# Patient Record
Sex: MALE | Race: OTHER | URBAN - METROPOLITAN AREA
[De-identification: names, ages, dates, MRNs, and addresses within clinical notes are randomized per-mention and may not be internally consistent; named-entity substitution may affect disease eponyms.]

---

## 2020-12-09 ENCOUNTER — HOSPITAL ENCOUNTER (INPATIENT)
Age: 45
LOS: 4 days | Discharge: HOME | DRG: 177 | End: 2020-12-14
Attending: STUDENT IN AN ORGANIZED HEALTH CARE EDUCATION/TRAINING PROGRAM | Admitting: INTERNAL MEDICINE

## 2020-12-09 DIAGNOSIS — J18.9 PNEUMONIA OF BOTH LUNGS DUE TO INFECTIOUS ORGANISM, UNSPECIFIED PART OF LUNG: Primary | ICD-10-CM

## 2020-12-09 DIAGNOSIS — Z20.822 SUSPECTED COVID-19 VIRUS INFECTION: ICD-10-CM

## 2020-12-09 LAB
ALBUMIN SERPL-MCNC: 2.6 G/DL (ref 3.5–5)
ALBUMIN/GLOB SERPL: 0.6 {RATIO} (ref 1.2–3.5)
ALP SERPL-CCNC: 273 U/L (ref 50–136)
ALT SERPL-CCNC: 150 U/L (ref 12–65)
ANION GAP SERPL CALC-SCNC: 6 MMOL/L (ref 7–16)
AST SERPL-CCNC: 162 U/L (ref 15–37)
BASOPHILS # BLD: 0 K/UL (ref 0–0.2)
BASOPHILS NFR BLD: 0 % (ref 0–2)
BILIRUB SERPL-MCNC: 0.9 MG/DL (ref 0.2–1.1)
BUN SERPL-MCNC: 7 MG/DL (ref 6–23)
CALCIUM SERPL-MCNC: 8.6 MG/DL (ref 8.3–10.4)
CHLORIDE SERPL-SCNC: 109 MMOL/L (ref 98–107)
CO2 SERPL-SCNC: 26 MMOL/L (ref 21–32)
CREAT SERPL-MCNC: 0.7 MG/DL (ref 0.8–1.5)
DIFFERENTIAL METHOD BLD: ABNORMAL
EOSINOPHIL # BLD: 0.2 K/UL (ref 0–0.8)
EOSINOPHIL NFR BLD: 3 % (ref 0.5–7.8)
ERYTHROCYTE [DISTWIDTH] IN BLOOD BY AUTOMATED COUNT: 12.6 % (ref 11.9–14.6)
GLOBULIN SER CALC-MCNC: 4.6 G/DL (ref 2.3–3.5)
GLUCOSE SERPL-MCNC: 107 MG/DL (ref 65–100)
HCT VFR BLD AUTO: 38.2 % (ref 41.1–50.3)
HGB BLD-MCNC: 12.9 G/DL (ref 13.6–17.2)
IMM GRANULOCYTES # BLD AUTO: 0 K/UL (ref 0–0.5)
IMM GRANULOCYTES NFR BLD AUTO: 1 % (ref 0–5)
LACTATE SERPL-SCNC: 0.9 MMOL/L (ref 0.4–2)
LYMPHOCYTES # BLD: 1.6 K/UL (ref 0.5–4.6)
LYMPHOCYTES NFR BLD: 24 % (ref 13–44)
MCH RBC QN AUTO: 28.7 PG (ref 26.1–32.9)
MCHC RBC AUTO-ENTMCNC: 33.8 G/DL (ref 31.4–35)
MCV RBC AUTO: 85.1 FL (ref 79.6–97.8)
MONOCYTES # BLD: 0.7 K/UL (ref 0.1–1.3)
MONOCYTES NFR BLD: 10 % (ref 4–12)
NEUTS SEG # BLD: 4.2 K/UL (ref 1.7–8.2)
NEUTS SEG NFR BLD: 62 % (ref 43–78)
NRBC # BLD: 0 K/UL (ref 0–0.2)
PLATELET # BLD AUTO: 405 K/UL (ref 150–450)
PMV BLD AUTO: 9.5 FL (ref 9.4–12.3)
POTASSIUM SERPL-SCNC: 3.8 MMOL/L (ref 3.5–5.1)
PROT SERPL-MCNC: 7.2 G/DL (ref 6.3–8.2)
RBC # BLD AUTO: 4.49 M/UL (ref 4.23–5.6)
SODIUM SERPL-SCNC: 141 MMOL/L (ref 136–145)
WBC # BLD AUTO: 6.8 K/UL (ref 4.3–11.1)

## 2020-12-09 PROCEDURE — 85025 COMPLETE CBC W/AUTO DIFF WBC: CPT

## 2020-12-09 PROCEDURE — 99285 EMERGENCY DEPT VISIT HI MDM: CPT

## 2020-12-09 PROCEDURE — 83605 ASSAY OF LACTIC ACID: CPT

## 2020-12-09 PROCEDURE — 80053 COMPREHEN METABOLIC PANEL: CPT

## 2020-12-10 ENCOUNTER — APPOINTMENT (OUTPATIENT)
Dept: ULTRASOUND IMAGING | Age: 45
DRG: 177 | End: 2020-12-10
Attending: INTERNAL MEDICINE

## 2020-12-10 ENCOUNTER — APPOINTMENT (OUTPATIENT)
Dept: GENERAL RADIOLOGY | Age: 45
DRG: 177 | End: 2020-12-10
Attending: STUDENT IN AN ORGANIZED HEALTH CARE EDUCATION/TRAINING PROGRAM

## 2020-12-10 ENCOUNTER — APPOINTMENT (OUTPATIENT)
Dept: CT IMAGING | Age: 45
DRG: 177 | End: 2020-12-10
Attending: INTERNAL MEDICINE

## 2020-12-10 PROBLEM — J96.90 RESPIRATORY FAILURE (HCC): Status: ACTIVE | Noted: 2020-12-10

## 2020-12-10 LAB
ALBUMIN SERPL-MCNC: 2.7 G/DL (ref 3.5–5)
ALBUMIN/GLOB SERPL: 0.5 {RATIO} (ref 1.2–3.5)
ALP SERPL-CCNC: 262 U/L (ref 50–136)
ALT SERPL-CCNC: 125 U/L (ref 12–65)
ANION GAP SERPL CALC-SCNC: 7 MMOL/L (ref 7–16)
AST SERPL-CCNC: 79 U/L (ref 15–37)
BASOPHILS # BLD: 0 K/UL (ref 0–0.2)
BASOPHILS NFR BLD: 0 % (ref 0–2)
BILIRUB SERPL-MCNC: 0.7 MG/DL (ref 0.2–1.1)
BUN SERPL-MCNC: 7 MG/DL (ref 6–23)
CALCIUM SERPL-MCNC: 9 MG/DL (ref 8.3–10.4)
CHLORIDE SERPL-SCNC: 108 MMOL/L (ref 98–107)
CO2 SERPL-SCNC: 25 MMOL/L (ref 21–32)
CREAT SERPL-MCNC: 0.64 MG/DL (ref 0.8–1.5)
D DIMER PPP FEU-MCNC: 2.05 UG/ML(FEU)
DIFFERENTIAL METHOD BLD: ABNORMAL
EOSINOPHIL # BLD: 0 K/UL (ref 0–0.8)
EOSINOPHIL NFR BLD: 0 % (ref 0.5–7.8)
ERYTHROCYTE [DISTWIDTH] IN BLOOD BY AUTOMATED COUNT: 12.7 % (ref 11.9–14.6)
FLUAV AG NPH QL IA: NEGATIVE
FLUBV AG NPH QL IA: NEGATIVE
GLOBULIN SER CALC-MCNC: 5 G/DL (ref 2.3–3.5)
GLUCOSE SERPL-MCNC: 164 MG/DL (ref 65–100)
HAV IGM SER QL: NONREACTIVE
HBV CORE IGM SER QL: NONREACTIVE
HBV SURFACE AG SER QL: NONREACTIVE
HCT VFR BLD AUTO: 41.1 % (ref 41.1–50.3)
HCV AB SER QL: NONREACTIVE
HGB BLD-MCNC: 13.9 G/DL (ref 13.6–17.2)
IMM GRANULOCYTES # BLD AUTO: 0 K/UL (ref 0–0.5)
IMM GRANULOCYTES NFR BLD AUTO: 1 % (ref 0–5)
LYMPHOCYTES # BLD: 0.8 K/UL (ref 0.5–4.6)
LYMPHOCYTES NFR BLD: 18 % (ref 13–44)
MCH RBC QN AUTO: 28.4 PG (ref 26.1–32.9)
MCHC RBC AUTO-ENTMCNC: 33.8 G/DL (ref 31.4–35)
MCV RBC AUTO: 83.9 FL (ref 79.6–97.8)
MONOCYTES # BLD: 0.1 K/UL (ref 0.1–1.3)
MONOCYTES NFR BLD: 3 % (ref 4–12)
NEUTS SEG # BLD: 3.8 K/UL (ref 1.7–8.2)
NEUTS SEG NFR BLD: 79 % (ref 43–78)
NRBC # BLD: 0 K/UL (ref 0–0.2)
PLATELET # BLD AUTO: 447 K/UL (ref 150–450)
PMV BLD AUTO: 9.5 FL (ref 9.4–12.3)
POTASSIUM SERPL-SCNC: 3.9 MMOL/L (ref 3.5–5.1)
PROCALCITONIN SERPL-MCNC: 0.18 NG/ML
PROT SERPL-MCNC: 7.7 G/DL (ref 6.3–8.2)
RBC # BLD AUTO: 4.9 M/UL (ref 4.23–5.6)
SARS COV-2, XPGCVT: NEGATIVE
SODIUM SERPL-SCNC: 140 MMOL/L (ref 136–145)
SOURCE, COVRS: NORMAL
SPECIMEN SOURCE: NORMAL
WBC # BLD AUTO: 4.8 K/UL (ref 4.3–11.1)

## 2020-12-10 PROCEDURE — 84145 PROCALCITONIN (PCT): CPT

## 2020-12-10 PROCEDURE — 87635 SARS-COV-2 COVID-19 AMP PRB: CPT

## 2020-12-10 PROCEDURE — 80074 ACUTE HEPATITIS PANEL: CPT

## 2020-12-10 PROCEDURE — 85379 FIBRIN DEGRADATION QUANT: CPT

## 2020-12-10 PROCEDURE — 96374 THER/PROPH/DIAG INJ IV PUSH: CPT

## 2020-12-10 PROCEDURE — 87449 NOS EACH ORGANISM AG IA: CPT

## 2020-12-10 PROCEDURE — 71260 CT THORAX DX C+: CPT

## 2020-12-10 PROCEDURE — 71045 X-RAY EXAM CHEST 1 VIEW: CPT

## 2020-12-10 PROCEDURE — 80053 COMPREHEN METABOLIC PANEL: CPT

## 2020-12-10 PROCEDURE — 87070 CULTURE OTHR SPECIMN AEROBIC: CPT

## 2020-12-10 PROCEDURE — 87040 BLOOD CULTURE FOR BACTERIA: CPT

## 2020-12-10 PROCEDURE — 74011250637 HC RX REV CODE- 250/637: Performed by: FAMILY MEDICINE

## 2020-12-10 PROCEDURE — 87804 INFLUENZA ASSAY W/OPTIC: CPT

## 2020-12-10 PROCEDURE — 74011000258 HC RX REV CODE- 258: Performed by: INTERNAL MEDICINE

## 2020-12-10 PROCEDURE — 85025 COMPLETE CBC W/AUTO DIFF WBC: CPT

## 2020-12-10 PROCEDURE — 96375 TX/PRO/DX INJ NEW DRUG ADDON: CPT

## 2020-12-10 PROCEDURE — 74011000636 HC RX REV CODE- 636: Performed by: INTERNAL MEDICINE

## 2020-12-10 PROCEDURE — 76705 ECHO EXAM OF ABDOMEN: CPT

## 2020-12-10 PROCEDURE — 65270000029 HC RM PRIVATE

## 2020-12-10 PROCEDURE — 74011250636 HC RX REV CODE- 250/636: Performed by: INTERNAL MEDICINE

## 2020-12-10 PROCEDURE — 74011250636 HC RX REV CODE- 250/636: Performed by: STUDENT IN AN ORGANIZED HEALTH CARE EDUCATION/TRAINING PROGRAM

## 2020-12-10 RX ORDER — PROMETHAZINE HYDROCHLORIDE 25 MG/1
12.5 TABLET ORAL
Status: DISCONTINUED | OUTPATIENT
Start: 2020-12-10 | End: 2020-12-15 | Stop reason: HOSPADM

## 2020-12-10 RX ORDER — DEXAMETHASONE SODIUM PHOSPHATE 100 MG/10ML
10 INJECTION INTRAMUSCULAR; INTRAVENOUS
Status: COMPLETED | OUTPATIENT
Start: 2020-12-10 | End: 2020-12-10

## 2020-12-10 RX ORDER — SODIUM CHLORIDE 0.9 % (FLUSH) 0.9 %
10 SYRINGE (ML) INJECTION
Status: COMPLETED | OUTPATIENT
Start: 2020-12-10 | End: 2020-12-10

## 2020-12-10 RX ORDER — SODIUM CHLORIDE 0.9 % (FLUSH) 0.9 %
5-40 SYRINGE (ML) INJECTION EVERY 8 HOURS
Status: DISCONTINUED | OUTPATIENT
Start: 2020-12-10 | End: 2020-12-15 | Stop reason: HOSPADM

## 2020-12-10 RX ORDER — HYDROCODONE BITARTRATE AND HOMATROPINE METHYLBROMIDE 1.5; 5 MG/5ML; MG/5ML
5 SYRUP ORAL
Status: DISCONTINUED | OUTPATIENT
Start: 2020-12-10 | End: 2020-12-15 | Stop reason: HOSPADM

## 2020-12-10 RX ORDER — LEVOFLOXACIN 5 MG/ML
750 INJECTION, SOLUTION INTRAVENOUS EVERY 24 HOURS
Status: DISCONTINUED | OUTPATIENT
Start: 2020-12-10 | End: 2020-12-15 | Stop reason: HOSPADM

## 2020-12-10 RX ORDER — ONDANSETRON 2 MG/ML
4 INJECTION INTRAMUSCULAR; INTRAVENOUS
Status: DISCONTINUED | OUTPATIENT
Start: 2020-12-10 | End: 2020-12-10

## 2020-12-10 RX ORDER — DEXAMETHASONE 4 MG/1
6 TABLET ORAL DAILY
Status: DISCONTINUED | OUTPATIENT
Start: 2020-12-10 | End: 2020-12-15 | Stop reason: HOSPADM

## 2020-12-10 RX ORDER — SODIUM CHLORIDE 0.9 % (FLUSH) 0.9 %
5-40 SYRINGE (ML) INJECTION AS NEEDED
Status: DISCONTINUED | OUTPATIENT
Start: 2020-12-10 | End: 2020-12-15 | Stop reason: HOSPADM

## 2020-12-10 RX ORDER — POLYETHYLENE GLYCOL 3350 17 G/17G
17 POWDER, FOR SOLUTION ORAL DAILY PRN
Status: DISCONTINUED | OUTPATIENT
Start: 2020-12-10 | End: 2020-12-15 | Stop reason: HOSPADM

## 2020-12-10 RX ORDER — LEVOFLOXACIN 5 MG/ML
750 INJECTION, SOLUTION INTRAVENOUS
Status: COMPLETED | OUTPATIENT
Start: 2020-12-10 | End: 2020-12-10

## 2020-12-10 RX ORDER — ACETAMINOPHEN 650 MG/1
650 SUPPOSITORY RECTAL
Status: DISCONTINUED | OUTPATIENT
Start: 2020-12-10 | End: 2020-12-10

## 2020-12-10 RX ORDER — ENOXAPARIN SODIUM 100 MG/ML
40 INJECTION SUBCUTANEOUS DAILY
Status: DISCONTINUED | OUTPATIENT
Start: 2020-12-10 | End: 2020-12-14

## 2020-12-10 RX ORDER — LABETALOL HYDROCHLORIDE 5 MG/ML
10 INJECTION, SOLUTION INTRAVENOUS
Status: DISCONTINUED | OUTPATIENT
Start: 2020-12-10 | End: 2020-12-15 | Stop reason: HOSPADM

## 2020-12-10 RX ORDER — ACETAMINOPHEN 325 MG/1
650 TABLET ORAL
Status: DISCONTINUED | OUTPATIENT
Start: 2020-12-10 | End: 2020-12-10

## 2020-12-10 RX ADMIN — Medication 10 ML: at 02:59

## 2020-12-10 RX ADMIN — LEVOFLOXACIN 750 MG: 5 INJECTION, SOLUTION INTRAVENOUS at 00:47

## 2020-12-10 RX ADMIN — SODIUM CHLORIDE 100 ML: 900 INJECTION, SOLUTION INTRAVENOUS at 09:24

## 2020-12-10 RX ADMIN — Medication 10 ML: at 13:58

## 2020-12-10 RX ADMIN — HYDROCODONE BITARTRATE AND HOMATROPINE METHYLBROMIDE 5 ML: 5; 1.5 SOLUTION ORAL at 21:21

## 2020-12-10 RX ADMIN — Medication 10 ML: at 22:00

## 2020-12-10 RX ADMIN — LEVOFLOXACIN 750 MG: 5 INJECTION, SOLUTION INTRAVENOUS at 21:21

## 2020-12-10 RX ADMIN — IOPAMIDOL 75 ML: 755 INJECTION, SOLUTION INTRAVENOUS at 09:24

## 2020-12-10 RX ADMIN — Medication 10 ML: at 05:16

## 2020-12-10 RX ADMIN — Medication 10 ML: at 09:24

## 2020-12-10 RX ADMIN — SODIUM CHLORIDE 1000 ML: 900 INJECTION, SOLUTION INTRAVENOUS at 00:41

## 2020-12-10 RX ADMIN — DEXAMETHASONE 6 MG: 4 TABLET ORAL at 11:23

## 2020-12-10 RX ADMIN — ENOXAPARIN SODIUM 40 MG: 40 INJECTION SUBCUTANEOUS at 11:23

## 2020-12-10 RX ADMIN — DEXAMETHASONE SODIUM PHOSPHATE 10 MG: 10 INJECTION INTRAMUSCULAR; INTRAVENOUS at 01:02

## 2020-12-10 NOTE — H&P
Hospitalist Note     Admit Date:  2020 11:56 PM   Name:  Colette Denver   Age:  50 y.o.  :  1975   MRN:  [id number]   PCP:  Jese Garcia MD  Treatment Team: Attending Provider: Toi Crews DO; Primary Nurse: Kign Grubbs RN    HPI/Subjective:     75-year-old male presents to the ER from [facility] with complaints of bilateral pneumonia. He was seen at urgent care earlier in the week for similar symptoms, diagnosed with a pneumonia, and treated with doxycycline and Rocephin. There is no improvement in symptoms so he was transferred here. Patient states he lives in [place] but came here 2 months ago for visiting a family. Reportedly his niece at home has Covid pneumonia. Patient started with symptoms 15 days ago with sinus-like symptoms but had progression to respiratory distress for the last few days. Pt currently required 2 L of oxygen without any prior known lung disease. Reportedly a Covid swab at [facility] was negative. 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 There is no medical history other than appendectomy 20 years ago. His family history is positive for diabetes in mother. In the ER chest x-ray with bilateral infiltrates suspicious for Covid pneumonia. He is currently complaining of cough, no chest pain. 10 systems reviewed and negative except as noted in HPI. No past medical history on file. No past surgical history on file. No Known Allergies   Social History     Tobacco Use    Smoking status: Not on file   Substance Use Topics    Alcohol use: Not on file      No family history on file. Family history reviewed and noncontributory. There is no immunization history on file for this patient.   PTA Medications:  None       Objective:     Patient Vitals for the past 24 hrs:   Temp Pulse Resp BP SpO2   12/10/20 0000  88  (!) 174/103 92 %   20 98.8 °F (37.1 °C) 76 18 (!) 170/111 98 % Oxygen Therapy  O2 Sat (%): 92 % (12/10/20 0000)  Pulse via Oximetry: 88 beats per minute (12/10/20 0000)  O2 Device: Nasal cannula (12/09/20 2016)  O2 Flow Rate (L/min): 3 l/min (12/09/20 2016)    Estimated body mass index is 29.29 kg/m² as calculated from the following:    Height as of this encounter: 5' (1.524 m). Weight as of this encounter: 68 kg (150 lb). No intake or output data in the 24 hours ending 12/10/20 0104    *Note that automatically entered I/Os may not be accurate; dependent on patient compliance with collection and accurate  by assistants. Physical Exam:  Physical Exam  Vitals signs and nursing note reviewed. Constitutional:       General: He is not in acute distress. Appearance: He is well-developed. He is not diaphoretic. Comments: Alert and oriented to person place and time. No acute distress, speaks in clear, fluid sentences. HENT:      Head: Normocephalic and atraumatic. Right Ear: External ear normal.      Left Ear: External ear normal.      Nose: Nose normal.   Eyes:      Pupils: Pupils are equal, round, and reactive to light. Neck:      Musculoskeletal: Normal range of motion. Cardiovascular:      Rate and Rhythm: Normal rate and regular rhythm. Heart sounds: Normal heart sounds. No murmur. No friction rub. No gallop. Pulmonary:      Effort: Pulmonary effort is normal. No tachypnea, bradypnea or respiratory distress. Breath sounds: No stridor. Decreased breath sounds present. No wheezing, rhonchi or rales. Comments: Coughing intermittently during exam.  Coarse throughout. No significant tachypnea or distress  Chest:      Chest wall: No tenderness. Abdominal:      General: There is no distension. Palpations: Abdomen is soft. There is no mass. Tenderness: There is no abdominal tenderness. There is no guarding or rebound. Hernia: No hernia is present. Musculoskeletal: Normal range of motion.          General: No tenderness or deformity. Skin:     General: Skin is warm and dry. Neurological:      Mental Status: He is alert and oriented to person, place, and time. Cranial Nerves: No cranial nerve deficit. I reviewed the labs, imaging, EKGs, telemetry, and other studies done this admission. Data Review:   Recent Results (from the past 24 hour(s))   CBC WITH AUTOMATED DIFF    Collection Time: 12/09/20  8:20 PM   Result Value Ref Range    WBC 6.8 4.3 - 11.1 K/uL    RBC 4.49 4.23 - 5.6 M/uL    HGB 12.9 (L) 13.6 - 17.2 g/dL    HCT 38.2 (L) 41.1 - 50.3 %    MCV 85.1 79.6 - 97.8 FL    MCH 28.7 26.1 - 32.9 PG    MCHC 33.8 31.4 - 35.0 g/dL    RDW 12.6 11.9 - 14.6 %    PLATELET 849 782 - 889 K/uL    MPV 9.5 9.4 - 12.3 FL    ABSOLUTE NRBC 0.00 0.0 - 0.2 K/uL    DF AUTOMATED      NEUTROPHILS 62 43 - 78 %    LYMPHOCYTES 24 13 - 44 %    MONOCYTES 10 4.0 - 12.0 %    EOSINOPHILS 3 0.5 - 7.8 %    BASOPHILS 0 0.0 - 2.0 %    IMMATURE GRANULOCYTES 1 0.0 - 5.0 %    ABS. NEUTROPHILS 4.2 1.7 - 8.2 K/UL    ABS. LYMPHOCYTES 1.6 0.5 - 4.6 K/UL    ABS. MONOCYTES 0.7 0.1 - 1.3 K/UL    ABS. EOSINOPHILS 0.2 0.0 - 0.8 K/UL    ABS. BASOPHILS 0.0 0.0 - 0.2 K/UL    ABS. IMM. GRANS. 0.0 0.0 - 0.5 K/UL   METABOLIC PANEL, COMPREHENSIVE    Collection Time: 12/09/20  8:20 PM   Result Value Ref Range    Sodium 141 136 - 145 mmol/L    Potassium 3.8 3.5 - 5.1 mmol/L    Chloride 109 (H) 98 - 107 mmol/L    CO2 26 21 - 32 mmol/L    Anion gap 6 (L) 7 - 16 mmol/L    Glucose 107 (H) 65 - 100 mg/dL    BUN 7 6 - 23 MG/DL    Creatinine 0.70 (L) 0.8 - 1.5 MG/DL    GFR est AA >60 >60 ml/min/1.73m2    GFR est non-AA >60 >60 ml/min/1.73m2    Calcium 8.6 8.3 - 10.4 MG/DL    Bilirubin, total 0.9 0.2 - 1.1 MG/DL    ALT (SGPT) 150 (H) 12 - 65 U/L    AST (SGOT) 162 (H) 15 - 37 U/L    Alk.  phosphatase 273 (H) 50 - 136 U/L    Protein, total 7.2 6.3 - 8.2 g/dL    Albumin 2.6 (L) 3.5 - 5.0 g/dL    Globulin 4.6 (H) 2.3 - 3.5 g/dL    A-G Ratio 0.6 (L) 1.2 - 3.5     LACTIC ACID    Collection Time: 12/09/20  8:20 PM   Result Value Ref Range    Lactic acid 0.9 0.4 - 2.0 MMOL/L       All Micro Results     Procedure Component Value Units Date/Time    CULTURE, BLOOD [[id number]] Collected:  12/10/20 0049    Order Status:  Completed Specimen:  Blood Updated:  12/10/20 0104    CULTURE, BLOOD [[id number]]     Order Status:  Sent Specimen:  Blood           Current Facility-Administered Medications   Medication Dose Route Frequency    levoFLOXacin (LEVAQUIN) 750 mg in D5W IVPB  750 mg IntraVENous NOW    sodium chloride 0.9 % bolus infusion 1,000 mL  1,000 mL IntraVENous ONCE     No current outpatient medications on file. Other Studies:  No results found for this visit on 12/09/20. Xr Chest Port    Result Date: 12/10/2020  EXAM: Chest x-ray. INDICATION: Chest pain. COMPARISON: None. TECHNIQUE: Frontal view chest x-ray. FINDINGS: There are patchy infiltrates throughout both lungs, worse on the right. The cardiac size, mediastinal contour and pulmonary vasculature are within normal limits. No pneumothorax or pleural effusion is seen. The bony structures are unremarkable. IMPRESSION: Bilateral lung infiltrates suspect for acute pneumonia, possibly Covid 19. Assessment and Plan:      68-year-old male no past medical history presents with progression of flulike illness with respiratory symptoms and cough, patient to be admitted for bilateral pneumonia.     Presumed Covid pneumonia vs ARDS  Acute hypoxemic respiratory failure  -Start Decadron, check Covid rapid and PCR, check pro-Adalberto and D-dimer, was given Levaquin, will continue, check sputum culture  -will obtain CT chest after dimer      abnormal LFTs-check right upper quadrant ultrasound, hepatitis panel, Legionella      Discharge planning: [facility]   DVT ppx ordered  Code status:  Full  Estimated LOS:  Greater than 2 midnights  Risk:  high    Signed:  Jazzy Rosenberg MD

## 2020-12-10 NOTE — ED PROVIDER NOTES
57-year-old  male patient presenting to the emergency department from local emergency [facility] facility with reports of bilateral pneumonia. Patient reportedly started feeling poorly approximately 10 to 15 days ago. He has a known contact with a COVID-19 patient and his daughter. He originally presented emergency [facility] on 12/4/2020 for similar symptoms. He was started on doxycycline after a gram of Rocephin was given in that facility. Patient reports no improvement states his symptoms worsen prompting his return to this facility today. This department was contacted by patient's evaluating provider stating that patient appeared worse and now is requiring 2 L nasal cannula which is abnormal for him. Patient denies any known lung disease or significant past medical history. He apparently had a Covid swab performed that was negative yesterday. There was an attempt to directly admit patient however there are no beds available and patient was subsequently transferred to this department for further evaluation and admission. No past medical history on file. No past surgical history on file. No family history on file.     Social History     Socioeconomic History    Marital status: SINGLE     Spouse name: Not on file    Number of children: Not on file    Years of education: Not on file    Highest education level: Not on file   Occupational History    Not on file   Social Needs    Financial resource strain: Not on file    Food insecurity     Worry: Not on file     Inability: Not on file    Transportation needs     Medical: Not on file     Non-medical: Not on file   Tobacco Use    Smoking status: Not on file   Substance and Sexual Activity    Alcohol use: Not on file    Drug use: Not on file    Sexual activity: Not on file   Lifestyle    Physical activity     Days per week: Not on file     Minutes per session: Not on file    Stress: Not on file   Relationships    Social connections Talks on phone: Not on file     Gets together: Not on file     Attends Buddhist service: Not on file     Active member of club or organization: Not on file     Attends meetings of clubs or organizations: Not on file     Relationship status: Not on file    Intimate partner violence     Fear of current or ex partner: Not on file     Emotionally abused: Not on file     Physically abused: Not on file     Forced sexual activity: Not on file   Other Topics Concern    Not on file   Social History Narrative    Not on file         ALLERGIES: Patient has no known allergies. Review of Systems   Constitutional: Positive for fever. Negative for chills and diaphoresis. HENT: Negative for congestion, sneezing and sore throat. Eyes: Negative for visual disturbance. Respiratory: Positive for cough, chest tightness and shortness of breath. Negative for wheezing. Cardiovascular: Negative for chest pain and leg swelling. Gastrointestinal: Negative for abdominal pain, blood in stool, diarrhea, nausea and vomiting. Endocrine: Negative for polyuria. Genitourinary: Negative for difficulty urinating, dysuria, flank pain, hematuria and urgency. Musculoskeletal: Negative for back pain, myalgias, neck pain and neck stiffness. Skin: Negative for color change and rash. Neurological: Negative for dizziness, syncope, speech difficulty, weakness, light-headedness, numbness and headaches. Psychiatric/Behavioral: Negative for behavioral problems. All other systems reviewed and are negative. Vitals:    12/09/20 2016 12/10/20 0000   BP: (!) 170/111 (!) 174/103   Pulse: 76 88   Resp: 18    Temp: 98.8 °F (37.1 °C)    SpO2: 98% 92%   Weight: 68 kg (150 lb)    Height: 5' (1.524 m)             Physical Exam  Vitals signs and nursing note reviewed. Constitutional:       General: He is not in acute distress. Appearance: He is well-developed. He is not diaphoretic.       Comments: Alert and oriented to person place and time. No acute distress, speaks in clear, fluid sentences. HENT:      Head: Normocephalic and atraumatic. Right Ear: External ear normal.      Left Ear: External ear normal.      Nose: Nose normal.   Eyes:      Pupils: Pupils are equal, round, and reactive to light. Neck:      Musculoskeletal: Normal range of motion. Cardiovascular:      Rate and Rhythm: Normal rate and regular rhythm. Heart sounds: Normal heart sounds. No murmur. No friction rub. No gallop. Pulmonary:      Effort: Pulmonary effort is normal. No tachypnea, bradypnea or respiratory distress. Breath sounds: No stridor. Decreased breath sounds present. No wheezing, rhonchi or rales. Comments: Coughing intermittently during exam.  Coarse throughout. No significant tachypnea or distress  Chest:      Chest wall: No tenderness. Abdominal:      General: There is no distension. Palpations: Abdomen is soft. There is no mass. Tenderness: There is no abdominal tenderness. There is no guarding or rebound. Hernia: No hernia is present. Musculoskeletal: Normal range of motion. General: No tenderness or deformity. Skin:     General: Skin is warm and dry. Neurological:      Mental Status: He is alert and oriented to person, place, and time. Cranial Nerves: No cranial nerve deficit.           MDM  Number of Diagnoses or Management Options     Amount and/or Complexity of Data Reviewed  Clinical lab tests: ordered and reviewed  Tests in the radiology section of CPT®: ordered and reviewed  Tests in the medicine section of CPT®: ordered and reviewed  Independent visualization of images, tracings, or specimens: yes           Procedures

## 2020-12-10 NOTE — PROGRESS NOTES
MSN, CM:  Spoke with patient this afternoon about discharge planning. Patient lives with his brother \"Ritesh\" in his home with 3 steps for entrance. Patient is independent with all ADL's and requires no equipment for ambulation. Patient denies any home oxygen, HH, or rehab in the past.  Patient confirms he has no PCP and uses the ED for primary care. Patient denies any discharge needs at this time. Case Management will continue to follow for discharge needs. Care Management Interventions  PCP Verified by CM: Yes(patient has no PCP)  Mode of Transport at Discharge:  Other (see comment)(family to transport)  Transition of Care Consult (CM Consult): Discharge Planning  Physical Therapy Consult: Yes  Current Support Network: Relative's Home, Other(brother lives in home)  Confirm Follow Up Transport: Self  Freedom of Choice List was Provided with Basic Dialogue that Supports the Patient's Individualized Plan of Care/Goals, Treatment Preferences and Shares the Quality Data Associated with the Providers?: Yes  Discharge Location  Discharge Placement: Home

## 2020-12-10 NOTE — ED NOTES
TRANSFER - OUT REPORT:    Verbal report given to Kerri Root RN (name) on Checo Mendoza  being transferred to 8(unit) for routine progression of care       Report consisted of patients Situation, Background, Assessment and   Recommendations(SBAR). Information from the following report(s) SBAR, Kardex, ED Summary, [facility] and Recent Results was reviewed with the receiving nurse. Lines:   Peripheral IV 12/09/20 Right Antecubital (Active)   Site Assessment Clean, dry, & intact 12/09/20 2019   Phlebitis Assessment 0 12/09/20 2019   Infiltration Assessment 0 12/09/20 2019   Dressing Status Clean, dry, & intact 12/09/20 2019   Dressing Type 4 X 4 12/09/20 2019   Hub Color/Line Status Pink 12/09/20 2019       Peripheral IV 12/10/20 Left Antecubital (Active)   Site Assessment Clean, dry, & intact 12/10/20 0053   Phlebitis Assessment 0 12/10/20 0053   Infiltration Assessment 0 12/10/20 0053   Dressing Status Clean, dry, & intact 12/10/20 0053       Peripheral IV 12/10/20 Right Hand (Active)   Site Assessment Clean, dry, & intact 12/10/20 0125   Phlebitis Assessment 0 12/10/20 0125   Infiltration Assessment 0 12/10/20 0125   Dressing Status Clean, dry, & intact 12/10/20 0125        Opportunity for questions and clarification was provided.       Patient transported with:   Dymant

## 2020-12-10 NOTE — PROGRESS NOTES
12/10/20 0255   Dual Skin Pressure Injury Assessment   Dual Skin Pressure Injury Assessment WDL   Second Care Provider (Based on [address] [place] [address]) MICHELLE Gallardo   Skin Integumentary   Skin Integumentary (WDL) WDL    Pressure  Injury Documentation No Pressure Injury Noted-Pressure Ulcer Prevention Initiated

## 2020-12-10 NOTE — ED TRIAGE NOTES
Patient arrives in wheelchair to triage with mask in place. Patient picked up by EMS from [facility].  Patient went to facility on 12/4 and diagnosed with pneumonia, given shot of rocephin and started on doxycyline. Patient tested negative for covid. Lives with niece, and she tested positive for covid on 12/5. Went back to urgent care today for worsening cough, shortness of breath, fever. Given 1g tylenol for fever of 100.5. Had repeat chest xray, did not send copy of exam, but has paper handouts with results. Patient was given 1L NS at facility. Placed on 3L NC due to O2 of 87% on room air. 20G R AC placed.

## 2020-12-10 NOTE — PROGRESS NOTES
[facility] staff  available over the phone from 3:30 p.m. - midnight. Please call Roshan at [phone] with any interpreting requests.         [facility]  Language Services Department  [address]  [address] [phone] (phone)

## 2020-12-10 NOTE — PROGRESS NOTES
Admitted after midnight with suspected covid PNA. CTA negative for clot, but suggestive of COVID and has known contact. PCR pending. Not wearing his oxygen at the moment, feels like he is working hard to breathe. Only feels very short of breath after coughing.

## 2020-12-10 NOTE — PROGRESS NOTES
rounded on patient with nurse. Interpreting services offered when needed. [facility] staff  available over the phone from 3:30 p.m. - midnight. Please call Roshan at [phone] with any interpreting requests.       [facility]  Language Services Department  [address]  [address]  [phone] (phone)

## 2020-12-10 NOTE — PROGRESS NOTES
PT arrived to unit from tele overflow in stable condition, a/o, with call bell in reach, vitals stable. Pt asked to use call bell for toileting assistance. Heparin drip noted and infusing in iv.

## 2020-12-10 NOTE — PROGRESS NOTES
TRANSFER - IN REPORT:    Verbal report received from Stefani Morocho RN(name) on [facility]  being received from ER(unit) for routine progression of care      Report consisted of patients Situation, Background, Assessment and   Recommendations(SBAR). Information from the following report(s) SBAR, Kardex and ED Summary was reviewed with the receiving nurse. Opportunity for questions and clarification was provided. Assessment completed upon patients arrival to unit and care assumed.

## 2020-12-11 LAB
L PNEUMO1 AG UR QL IA: NEGATIVE
SPECIMEN SOURCE: NORMAL

## 2020-12-11 PROCEDURE — 74011250636 HC RX REV CODE- 250/636: Performed by: INTERNAL MEDICINE

## 2020-12-11 PROCEDURE — 74011250637 HC RX REV CODE- 250/637: Performed by: FAMILY MEDICINE

## 2020-12-11 PROCEDURE — 65270000029 HC RM PRIVATE

## 2020-12-11 RX ORDER — ALBUTEROL SULFATE 0.83 MG/ML
2.5 SOLUTION RESPIRATORY (INHALATION)
Status: ACTIVE | OUTPATIENT
Start: 2020-12-11 | End: 2020-12-12

## 2020-12-11 RX ORDER — ALBUTEROL SULFATE 0.83 MG/ML
2.5 SOLUTION RESPIRATORY (INHALATION)
Status: DISCONTINUED | OUTPATIENT
Start: 2020-12-11 | End: 2020-12-13

## 2020-12-11 RX ADMIN — ENOXAPARIN SODIUM 40 MG: 40 INJECTION SUBCUTANEOUS at 08:03

## 2020-12-11 RX ADMIN — Medication 5 ML: at 14:00

## 2020-12-11 RX ADMIN — Medication 10 ML: at 17:05

## 2020-12-11 RX ADMIN — DEXAMETHASONE 6 MG: 4 TABLET ORAL at 08:04

## 2020-12-11 RX ADMIN — LEVOFLOXACIN 750 MG: 5 INJECTION, SOLUTION INTRAVENOUS at 22:24

## 2020-12-11 RX ADMIN — Medication 10 ML: at 22:28

## 2020-12-11 RX ADMIN — HYDROCODONE BITARTRATE AND HOMATROPINE METHYLBROMIDE 5 ML: 5; 1.5 SOLUTION ORAL at 22:24

## 2020-12-11 RX ADMIN — Medication 10 ML: at 05:56

## 2020-12-11 RX ADMIN — HYDROCODONE BITARTRATE AND HOMATROPINE METHYLBROMIDE 5 ML: 5; 1.5 SOLUTION ORAL at 12:15

## 2020-12-11 NOTE — PROGRESS NOTES
Interpreting services available Please call [phone] for assistance      [place] [address]  [phone] (phone)

## 2020-12-12 PROCEDURE — 87635 SARS-COV-2 COVID-19 AMP PRB: CPT

## 2020-12-12 PROCEDURE — 94640 AIRWAY INHALATION TREATMENT: CPT

## 2020-12-12 PROCEDURE — 74011000250 HC RX REV CODE- 250: Performed by: FAMILY MEDICINE

## 2020-12-12 PROCEDURE — 74011250637 HC RX REV CODE- 250/637: Performed by: FAMILY MEDICINE

## 2020-12-12 PROCEDURE — 65270000029 HC RM PRIVATE

## 2020-12-12 PROCEDURE — 2709999900 HC NON-CHARGEABLE SUPPLY

## 2020-12-12 PROCEDURE — 74011250636 HC RX REV CODE- 250/636: Performed by: INTERNAL MEDICINE

## 2020-12-12 PROCEDURE — 94760 N-INVAS EAR/PLS OXIMETRY 1: CPT

## 2020-12-12 RX ADMIN — ALBUTEROL SULFATE 2.5 MG: 2.5 SOLUTION RESPIRATORY (INHALATION) at 22:15

## 2020-12-12 RX ADMIN — HYDROCODONE BITARTRATE AND HOMATROPINE METHYLBROMIDE 5 ML: 5; 1.5 SOLUTION ORAL at 16:13

## 2020-12-12 RX ADMIN — DEXAMETHASONE 6 MG: 4 TABLET ORAL at 09:07

## 2020-12-12 RX ADMIN — LEVOFLOXACIN 750 MG: 5 INJECTION, SOLUTION INTRAVENOUS at 21:54

## 2020-12-12 RX ADMIN — ENOXAPARIN SODIUM 40 MG: 40 INJECTION SUBCUTANEOUS at 09:07

## 2020-12-12 RX ADMIN — HYDROCODONE BITARTRATE AND HOMATROPINE METHYLBROMIDE 5 ML: 5; 1.5 SOLUTION ORAL at 21:54

## 2020-12-12 RX ADMIN — HYDROCODONE BITARTRATE AND HOMATROPINE METHYLBROMIDE 5 ML: 5; 1.5 SOLUTION ORAL at 09:14

## 2020-12-12 RX ADMIN — Medication 5 ML: at 16:15

## 2020-12-12 RX ADMIN — ALBUTEROL SULFATE 2.5 MG: 2.5 SOLUTION RESPIRATORY (INHALATION) at 01:34

## 2020-12-12 RX ADMIN — Medication 10 ML: at 21:55

## 2020-12-12 RX ADMIN — ALBUTEROL SULFATE 2.5 MG: 2.5 SOLUTION RESPIRATORY (INHALATION) at 13:48

## 2020-12-12 RX ADMIN — ALBUTEROL SULFATE 2.5 MG: 2.5 SOLUTION RESPIRATORY (INHALATION) at 07:28

## 2020-12-12 RX ADMIN — Medication 10 ML: at 06:18

## 2020-12-12 NOTE — PROGRESS NOTES
Patient voices no concerns at this time. Patient is resting in bed with eyes closed and respirations even. Patient denies any pain and appears comfortable at this time. Call light within reach and patient instructed to call if assistance is needed. Will continue to monitor.

## 2020-12-12 NOTE — PROGRESS NOTES
Patient stable with no complaints at this time. Patient is resting in bed with eyes closed. Patient has been given hycodan x2 today for cough. Medication does seem to help. Call light within reach and patient instructed to call if assistance is needed.   Report to be given to oncoming RN 7p-7a

## 2020-12-12 NOTE — PROGRESS NOTES
Pt having heavy coughing and requested something to help relieve cough. Gave pt Hycodan 5 mL per MD orders.

## 2020-12-12 NOTE — PROGRESS NOTES
Problem: Falls - Risk of  Goal: *Absence of Falls  Description: Document Mandy Miller Fall Risk and appropriate interventions in the flowsheet.   Outcome: Progressing Towards Goal  Note: Fall Risk Interventions:  Mobility Interventions: Patient to call before getting OOB              Elimination Interventions: Call light in reach, Patient to call for help with toileting needs              Problem: Patient Education: Go to Patient Education Activity  Goal: Patient/Family Education  Outcome: Progressing Towards Goal

## 2020-12-12 NOTE — PROGRESS NOTES
Hospitalist Progress Note    2020  Admit Date: 2020 11:56 PM   NAME: Suzanne Tan   :  1975   MRN:  [id number]   Attending: Gabriel Hess MD  PCP:  Thomas Rice MD    SUBJECTIVE:   O2 low at times, especially with coughing spells, ambulating to restroom. Subjective fevers  Hycodan helps some    Review of Systems negative with exception of pertinent positives noted above  PHYSICAL EXAM     Visit Vitals  BP (!) 138/91 (BP 1 Location: Left arm, BP Patient Position: At rest)   Pulse 80   Temp 98.4 °F (36.9 °C)   Resp 18   Ht 5' (1.524 m)   Wt 68 kg (150 lb)   SpO2 91%   BMI 29.29 kg/m²      Temp (24hrs), Av.2 °F (36.8 °C), Min:97.9 °F (36.6 °C), Max:98.4 °F (36.9 °C)    Oxygen Therapy  O2 Sat (%): 91 % (20 0800)  Pulse via Oximetry: 70 beats per minute (20)  O2 Device: Room air (20)  O2 Flow Rate (L/min): 3 l/min (12/10/20 1906)    Intake/Output Summary (Last 24 hours) at 2020 1312  Last data filed at 2020 0310  Gross per 24 hour   Intake    Output 475 ml   Net -475 ml      General: No acute distress    Lungs:  Coarse, rales bases   Heart:  Regular rate and rhythm,  No murmur, rub, or gallop  Abdomen: Soft, Non distended, Non tender, Positive bowel sounds  Extremities: No cyanosis, clubbing or edema  Neurologic:  No focal deficits    US ABD LTD   Final Result   IMPRESSION:    Hepatic steatosis. CT CHEST W CONT   Final Result   IMPRESSION:   1. No evidence of pulmonary embolism. 2. Multifocal atypical pneumonia with a pattern commonly seen with Covid 19. XR CHEST PORT   Final Result   IMPRESSION: Bilateral lung infiltrates suspect for acute pneumonia, possibly   Covid 19. ASSESSMENT      Plan:    Tested negative for COVID, but with clinical presentation and radiographic findings, could represent a false negative.  Retest.  Continue levaquin for pneumonia, trend pct  Treat cough symptomatically  Supplemental o2 as needed  Check pBNP      Signed By: William Davila MD     December 12, 2020

## 2020-12-13 LAB
ALBUMIN SERPL-MCNC: 2.6 G/DL (ref 3.5–5)
ALBUMIN/GLOB SERPL: 0.6 {RATIO} (ref 1.2–3.5)
ALP SERPL-CCNC: 178 U/L (ref 50–136)
ALT SERPL-CCNC: 99 U/L (ref 12–65)
ANION GAP SERPL CALC-SCNC: 8 MMOL/L (ref 7–16)
AST SERPL-CCNC: 38 U/L (ref 15–37)
BACTERIA SPEC CULT: NORMAL
BASOPHILS # BLD: 0 K/UL (ref 0–0.2)
BASOPHILS NFR BLD: 0 % (ref 0–2)
BILIRUB SERPL-MCNC: 0.5 MG/DL (ref 0.2–1.1)
BNP SERPL-MCNC: 31 PG/ML (ref 5–125)
BUN SERPL-MCNC: 13 MG/DL (ref 6–23)
CALCIUM SERPL-MCNC: 8.7 MG/DL (ref 8.3–10.4)
CHLORIDE SERPL-SCNC: 103 MMOL/L (ref 98–107)
CO2 SERPL-SCNC: 27 MMOL/L (ref 21–32)
CREAT SERPL-MCNC: 0.67 MG/DL (ref 0.8–1.5)
DIFFERENTIAL METHOD BLD: ABNORMAL
EOSINOPHIL # BLD: 0.1 K/UL (ref 0–0.8)
EOSINOPHIL NFR BLD: 1 % (ref 0.5–7.8)
ERYTHROCYTE [DISTWIDTH] IN BLOOD BY AUTOMATED COUNT: 12.6 % (ref 11.9–14.6)
GLOBULIN SER CALC-MCNC: 4.3 G/DL (ref 2.3–3.5)
GLUCOSE SERPL-MCNC: 126 MG/DL (ref 65–100)
GRAM STN SPEC: NORMAL
HCT VFR BLD AUTO: 38.8 % (ref 41.1–50.3)
HGB BLD-MCNC: 13.3 G/DL (ref 13.6–17.2)
IMM GRANULOCYTES # BLD AUTO: 0.1 K/UL (ref 0–0.5)
IMM GRANULOCYTES NFR BLD AUTO: 1 % (ref 0–5)
LYMPHOCYTES # BLD: 1.6 K/UL (ref 0.5–4.6)
LYMPHOCYTES NFR BLD: 19 % (ref 13–44)
MCH RBC QN AUTO: 28.4 PG (ref 26.1–32.9)
MCHC RBC AUTO-ENTMCNC: 34.3 G/DL (ref 31.4–35)
MCV RBC AUTO: 82.7 FL (ref 79.6–97.8)
MONOCYTES # BLD: 0.7 K/UL (ref 0.1–1.3)
MONOCYTES NFR BLD: 9 % (ref 4–12)
NEUTS SEG # BLD: 5.9 K/UL (ref 1.7–8.2)
NEUTS SEG NFR BLD: 70 % (ref 43–78)
NRBC # BLD: 0 K/UL (ref 0–0.2)
PLATELET # BLD AUTO: 489 K/UL (ref 150–450)
PMV BLD AUTO: 9.4 FL (ref 9.4–12.3)
POTASSIUM SERPL-SCNC: 3.8 MMOL/L (ref 3.5–5.1)
PROCALCITONIN SERPL-MCNC: <0.05 NG/ML
PROT SERPL-MCNC: 6.9 G/DL (ref 6.3–8.2)
RBC # BLD AUTO: 4.69 M/UL (ref 4.23–5.6)
SARS COV-2, XPGCVT: POSITIVE
SERVICE CMNT-IMP: NORMAL
SODIUM SERPL-SCNC: 138 MMOL/L (ref 136–145)
WBC # BLD AUTO: 8.5 K/UL (ref 4.3–11.1)

## 2020-12-13 PROCEDURE — 83880 ASSAY OF NATRIURETIC PEPTIDE: CPT

## 2020-12-13 PROCEDURE — 74011000250 HC RX REV CODE- 250: Performed by: FAMILY MEDICINE

## 2020-12-13 PROCEDURE — 84145 PROCALCITONIN (PCT): CPT

## 2020-12-13 PROCEDURE — 85025 COMPLETE CBC W/AUTO DIFF WBC: CPT

## 2020-12-13 PROCEDURE — 94760 N-INVAS EAR/PLS OXIMETRY 1: CPT

## 2020-12-13 PROCEDURE — 65270000029 HC RM PRIVATE

## 2020-12-13 PROCEDURE — 94640 AIRWAY INHALATION TREATMENT: CPT

## 2020-12-13 PROCEDURE — 74011250637 HC RX REV CODE- 250/637: Performed by: FAMILY MEDICINE

## 2020-12-13 PROCEDURE — 74011250636 HC RX REV CODE- 250/636: Performed by: INTERNAL MEDICINE

## 2020-12-13 PROCEDURE — 80053 COMPREHEN METABOLIC PANEL: CPT

## 2020-12-13 RX ORDER — ALBUTEROL SULFATE 90 UG/1
2 AEROSOL, METERED RESPIRATORY (INHALATION)
Status: DISCONTINUED | OUTPATIENT
Start: 2020-12-13 | End: 2020-12-14

## 2020-12-13 RX ADMIN — HYDROCODONE BITARTRATE AND HOMATROPINE METHYLBROMIDE 5 ML: 5; 1.5 SOLUTION ORAL at 11:28

## 2020-12-13 RX ADMIN — ALBUTEROL SULFATE 2.5 MG: 2.5 SOLUTION RESPIRATORY (INHALATION) at 07:23

## 2020-12-13 RX ADMIN — Medication 10 ML: at 13:49

## 2020-12-13 RX ADMIN — Medication 10 ML: at 22:15

## 2020-12-13 RX ADMIN — ALBUTEROL SULFATE 2.5 MG: 2.5 SOLUTION RESPIRATORY (INHALATION) at 03:44

## 2020-12-13 RX ADMIN — HYDROCODONE BITARTRATE AND HOMATROPINE METHYLBROMIDE 5 ML: 5; 1.5 SOLUTION ORAL at 22:55

## 2020-12-13 RX ADMIN — ENOXAPARIN SODIUM 40 MG: 40 INJECTION SUBCUTANEOUS at 08:51

## 2020-12-13 RX ADMIN — LEVOFLOXACIN 750 MG: 5 INJECTION, SOLUTION INTRAVENOUS at 22:25

## 2020-12-13 RX ADMIN — HYDROCODONE BITARTRATE AND HOMATROPINE METHYLBROMIDE 5 ML: 5; 1.5 SOLUTION ORAL at 05:29

## 2020-12-13 RX ADMIN — ALBUTEROL SULFATE 2.5 MG: 2.5 SOLUTION RESPIRATORY (INHALATION) at 13:00

## 2020-12-13 RX ADMIN — DEXAMETHASONE 6 MG: 4 TABLET ORAL at 08:51

## 2020-12-13 NOTE — PROGRESS NOTES
Pt oxygen was 89-90% on room air.  Put NC on at 3L, will reassess in 30 minutes to see if I can wean down

## 2020-12-13 NOTE — PROGRESS NOTES
Hospitalist Progress Note    2020  Admit Date: 2020 11:56 PM   NAME: Arabella Zarate   :  1975   MRN:  [id number]   Attending: Trini Gibbs MD  PCP:  Thomas Rice MD    SUBJECTIVE:   51yo M without significant medical hx who presented from urgent care with bilateral pneumonia that failed outpatient treatement. He is visiting from [place] and has a positive covid contact here but his initial test is negative. CT chest with bilateral infiltrates concerning for covid. procal low and trended negative on levaquin. bnp negative. No tobacco or drug use. Denies chemical/toxin exposure. he is a professional yeh. Mild transaminitis improving      O2 remains low, now on 2-3 liters, cough slightly better. No fevers. Initial covid test negative, repeat pending  procalcitonin low, trended to zero      Review of Systems negative with exception of pertinent positives noted above  PHYSICAL EXAM     Visit Vitals  /86   Pulse 97   Temp 99.3 °F (37.4 °C)   Resp 18   Ht 5' (1.524 m)   Wt 68 kg (150 lb)   SpO2 93%   BMI 29.29 kg/m²      Temp (24hrs), Av.5 °F (36.9 °C), Min:97.9 °F (36.6 °C), Max:99.3 °F (37.4 °C)    Oxygen Therapy  O2 Sat (%): 93 % (20 0730)  Pulse via Oximetry: 70 beats per minute (20 0723)  O2 Device: Room air (20 0900)  O2 Flow Rate (L/min): 3 l/min (12/10/20 1906)    Intake/Output Summary (Last 24 hours) at 2020 1307  Last data filed at 2020 2356  Gross per 24 hour   Intake    Output 350 ml   Net -350 ml      General: No acute distress    Lungs:  Coarse, rales bases   Heart:  Regular rate and rhythm,  No murmur, rub, or gallop  Abdomen: Soft, Non distended, Non tender, Positive bowel sounds  Extremities: No cyanosis, clubbing or edema  Neurologic:  No focal deficits    US ABD LTD   Final Result   IMPRESSION:    Hepatic steatosis. CT CHEST W CONT   Final Result   IMPRESSION:   1. No evidence of pulmonary embolism.    2. Multifocal atypical pneumonia with a pattern commonly seen with Covid 19. XR CHEST PORT   Final Result   IMPRESSION: Bilateral lung infiltrates suspect for acute pneumonia, possibly   Covid 19. ASSESSMENT      Plan:    Tested negative for COVID, but with clinical presentation and radiographic findings, could represent a false negative.  Retest.  Continue levaquin for pneumonia, trend pct  Treat cough symptomatically  Supplemental o2 as needed  If repeat covid testing is negative, will consult pulmonology       Signed By: William Davila MD     December 13, 2020

## 2020-12-13 NOTE — PROGRESS NOTES
Problem: Falls - Risk of  Goal: *Absence of Falls  Description: Document Rosina Cruz Fall Risk and appropriate interventions in the flowsheet. Outcome: Progressing Towards Goal  Note: Fall Risk Interventions:  Mobility Interventions: Patient to call before getting OOB         Medication Interventions: Evaluate medications/consider consulting pharmacy    Elimination Interventions: Call light in reach, Patient to call for help with toileting needs              Problem: Patient Education: Go to Patient Education Activity  Goal: Patient/Family Education  Outcome: Progressing Towards Goal     Problem: Risk for Spread of Infection  Goal: Prevent transmission of infectious organism to others  Description: Prevent the transmission of infectious organisms to other patients, staff members, and visitors.   Outcome: Progressing Towards Goal     Problem: Patient Education:  Go to Education Activity  Goal: Patient/Family Education  Outcome: Progressing Towards Goal

## 2020-12-14 VITALS
SYSTOLIC BLOOD PRESSURE: 139 MMHG | HEIGHT: 60 IN | WEIGHT: 150 LBS | BODY MASS INDEX: 29.45 KG/M2 | DIASTOLIC BLOOD PRESSURE: 98 MMHG | RESPIRATION RATE: 17 BRPM | OXYGEN SATURATION: 92 % | HEART RATE: 87 BPM | TEMPERATURE: 98.2 F

## 2020-12-14 LAB
ALBUMIN SERPL-MCNC: 2.9 G/DL (ref 3.5–5)
ALBUMIN/GLOB SERPL: 0.6 {RATIO} (ref 1.2–3.5)
ALP SERPL-CCNC: 195 U/L (ref 50–136)
ALT SERPL-CCNC: 122 U/L (ref 12–65)
ANION GAP SERPL CALC-SCNC: 7 MMOL/L (ref 7–16)
AST SERPL-CCNC: 46 U/L (ref 15–37)
BILIRUB SERPL-MCNC: 0.6 MG/DL (ref 0.2–1.1)
BUN SERPL-MCNC: 13 MG/DL (ref 6–23)
CALCIUM SERPL-MCNC: 9.4 MG/DL (ref 8.3–10.4)
CHLORIDE SERPL-SCNC: 103 MMOL/L (ref 98–107)
CO2 SERPL-SCNC: 26 MMOL/L (ref 21–32)
CREAT SERPL-MCNC: 0.81 MG/DL (ref 0.8–1.5)
ERYTHROCYTE [DISTWIDTH] IN BLOOD BY AUTOMATED COUNT: 12.8 % (ref 11.9–14.6)
GLOBULIN SER CALC-MCNC: 4.7 G/DL (ref 2.3–3.5)
GLUCOSE SERPL-MCNC: 161 MG/DL (ref 65–100)
HCT VFR BLD AUTO: 43.5 % (ref 41.1–50.3)
HGB BLD-MCNC: 14.5 G/DL (ref 13.6–17.2)
MCH RBC QN AUTO: 28.4 PG (ref 26.1–32.9)
MCHC RBC AUTO-ENTMCNC: 33.3 G/DL (ref 31.4–35)
MCV RBC AUTO: 85.3 FL (ref 79.6–97.8)
NRBC # BLD: 0 K/UL (ref 0–0.2)
PLATELET # BLD AUTO: 554 K/UL (ref 150–450)
PMV BLD AUTO: 9.7 FL (ref 9.4–12.3)
POTASSIUM SERPL-SCNC: 3.5 MMOL/L (ref 3.5–5.1)
PROT SERPL-MCNC: 7.6 G/DL (ref 6.3–8.2)
RBC # BLD AUTO: 5.1 M/UL (ref 4.23–5.6)
SODIUM SERPL-SCNC: 136 MMOL/L (ref 138–145)
WBC # BLD AUTO: 10.8 K/UL (ref 4.3–11.1)

## 2020-12-14 PROCEDURE — 2709999900 HC NON-CHARGEABLE SUPPLY

## 2020-12-14 PROCEDURE — 85027 COMPLETE CBC AUTOMATED: CPT

## 2020-12-14 PROCEDURE — 36415 COLL VENOUS BLD VENIPUNCTURE: CPT

## 2020-12-14 PROCEDURE — 77010033678 HC OXYGEN DAILY

## 2020-12-14 PROCEDURE — 80053 COMPREHEN METABOLIC PANEL: CPT

## 2020-12-14 PROCEDURE — 94640 AIRWAY INHALATION TREATMENT: CPT

## 2020-12-14 PROCEDURE — 94760 N-INVAS EAR/PLS OXIMETRY 1: CPT

## 2020-12-14 PROCEDURE — 74011250637 HC RX REV CODE- 250/637: Performed by: FAMILY MEDICINE

## 2020-12-14 PROCEDURE — 74011250636 HC RX REV CODE- 250/636: Performed by: INTERNAL MEDICINE

## 2020-12-14 RX ORDER — ALBUTEROL SULFATE 90 UG/1
2 AEROSOL, METERED RESPIRATORY (INHALATION)
Status: DISCONTINUED | OUTPATIENT
Start: 2020-12-14 | End: 2020-12-15 | Stop reason: HOSPADM

## 2020-12-14 RX ORDER — ALBUTEROL SULFATE 90 UG/1
2 AEROSOL, METERED RESPIRATORY (INHALATION)
Qty: 1 INHALER | Refills: 0 | Status: SHIPPED | OUTPATIENT
Start: 2020-12-14

## 2020-12-14 RX ORDER — HYDROCODONE BITARTRATE AND HOMATROPINE METHYLBROMIDE 1.5; 5 MG/5ML; MG/5ML
5 SYRUP ORAL
Qty: 100 ML | Refills: 0 | Status: SHIPPED | OUTPATIENT
Start: 2020-12-14 | End: 2020-12-17

## 2020-12-14 RX ORDER — DEXAMETHASONE 6 MG/1
6 TABLET ORAL
Qty: 7 TAB | Refills: 0 | Status: SHIPPED | OUTPATIENT
Start: 2020-12-14

## 2020-12-14 RX ORDER — ENOXAPARIN SODIUM 100 MG/ML
30 INJECTION SUBCUTANEOUS EVERY 12 HOURS
Status: DISCONTINUED | OUTPATIENT
Start: 2020-12-14 | End: 2020-12-15 | Stop reason: HOSPADM

## 2020-12-14 RX ADMIN — ENOXAPARIN SODIUM 40 MG: 40 INJECTION SUBCUTANEOUS at 09:38

## 2020-12-14 RX ADMIN — HYDROCODONE BITARTRATE AND HOMATROPINE METHYLBROMIDE 5 ML: 5; 1.5 SOLUTION ORAL at 11:03

## 2020-12-14 RX ADMIN — Medication 5 ML: at 17:47

## 2020-12-14 RX ADMIN — Medication 10 ML: at 06:19

## 2020-12-14 RX ADMIN — HYDROCODONE BITARTRATE AND HOMATROPINE METHYLBROMIDE 5 ML: 5; 1.5 SOLUTION ORAL at 17:45

## 2020-12-14 RX ADMIN — ALBUTEROL SULFATE 2 PUFF: 108 AEROSOL, METERED RESPIRATORY (INHALATION) at 02:46

## 2020-12-14 RX ADMIN — ALBUTEROL SULFATE 2 PUFF: 108 AEROSOL, METERED RESPIRATORY (INHALATION) at 07:29

## 2020-12-14 RX ADMIN — DEXAMETHASONE 6 MG: 4 TABLET ORAL at 09:38

## 2020-12-14 NOTE — PROGRESS NOTES
Oxygen Qualifier       Room air: SpO2 with O2 and liter flow   Resting SpO2  92%     Ambulating SpO2 91%        Completed by:    Honey Rodriguez, RT

## 2020-12-14 NOTE — DISCHARGE SUMMARY
Hospitalist Discharge Summary     Patient ID:  Eula Mahajan  [id number]  75 y.o.  1975  Admit date: 12/9/2020 11:56 PM  Discharge date and time: 12/14/2020  Attending: Judith Rosa MD  PCP:  Daryl Lopez MD  Treatment Team: Attending Provider: Judith Rosa MD; Utilization Review: Charles Mai RN; Primary Nurse: Brennon Gray    Principal Diagnosis <principal problem not specified>   Active Problems:    Respiratory failure [facility]) (12/10/2020)             Hospital Course:  Please refer to the admission H&P for details of presentation. In summary, the patient is a healthy 53yo M with no significant medical history who presented with cough, weakness in the setting of known COVID contacts. Found to have bilateral pneumonia but initial COVID test negative. A confirmatory test was positive for COVID. He was at worst mildly hypoxic and mostly symptomatic from cough. He has been treated with hycodan and dexamethasone with symptomatic improvement. Hypoxia resolved at time of discharge. Significant Diagnostic Studies:   US ABD LTD   Final Result   IMPRESSION:    Hepatic steatosis. CT CHEST W CONT   Final Result   IMPRESSION:   1. No evidence of pulmonary embolism. 2. Multifocal atypical pneumonia with a pattern commonly seen with Covid 19. XR CHEST PORT   Final Result   IMPRESSION: Bilateral lung infiltrates suspect for acute pneumonia, possibly   Covid 19.             Labs: Results:       Chemistry Recent Labs     12/14/20  1038 12/13/20  0503   * 126*   * 138   K 3.5 3.8    103   CO2 26 27   BUN 13 13   CREA 0.81 0.67*   CA 9.4 8.7   AGAP 7 8   * 178*   TP 7.6 6.9   ALB 2.9* 2.6*   GLOB 4.7* 4.3*   AGRAT 0.6* 0.6*      CBC w/Diff Recent Labs     12/14/20  1038 12/13/20  0503   WBC 10.8 8.5   RBC 5.10 4.69   HGB 14.5 13.3*   HCT 43.5 38.8*   * 489*   GRANS  --  70   LYMPH  --  19   EOS  --  1      Cardiac Enzymes No results for input(s): CPK, CKND1, LAURA in the last 72 hours. No lab exists for component: CKRMB, TROIP   Coagulation No results for input(s): PTP, INR, APTT, INREXT in the last 72 hours. Lipid Panel No results found for: CHOL, CHOLPOCT, CHOLX, CHLST, CHOLV, 137580, HDL, HDLP, LDL, LDLC, DLDLP, 742256, VLDLC, VLDL, TGLX, TRIGL, TRIGP, TGLPOCT, CHHD, CHHDX   BNP No results for input(s): BNPP in the last 72 hours. Liver Enzymes Recent Labs     12/14/20  1038   TP 7.6   ALB 2.9*   *      Thyroid Studies No results found for: T4, T3U, TSH, TSHEXT         Discharge Exam:  Visit Vitals  BP (!) 130/96 (BP 1 Location: Left arm, BP Patient Position: At rest)   Pulse 93   Temp 98.3 °F (36.8 °C)   Resp 18   Ht 5' (1.524 m)   Wt 68 kg (150 lb)   SpO2 91%   BMI 29.29 kg/m²     General appearance: alert, cooperative, no distress, appears stated age   Lungs: clear to auscultation bilaterally  Heart: regular rate and rhythm, S1, S2 normal, no murmur, click, rub or gallop  Abdomen: soft, non-tender. Bowel sounds normal. No masses,  no organomegaly  Extremities: no cyanosis or edema  Neurologic: Grossly normal    Disposition: home  Discharge Condition: stable  Patient Instructions:   Current Discharge Medication List      START taking these medications    Details   albuterol (PROVENTIL HFA, VENTOLIN HFA, PROAIR HFA) 90 mcg/actuation inhaler Take 2 Puffs by inhalation every six (6) hours as needed for Wheezing or Shortness of Breath (Coached patient on proper use and frequency of inhaler). Qty: 1 Inhaler, Refills: 0      dexAMETHasone (DECADRON) 6 mg tablet Take 1 Tab by mouth Daily (before breakfast). Qty: 7 Tab, Refills: 0      HYDROcodone-homatropine (HYCODAN) 5-1.5 mg/5 mL (5 mL) oral solution Take 5 mL by mouth every four (4) hours as needed for Cough for up to 3 days. Max Daily Amount: 30 mL.   Qty: 100 mL, Refills: 0    Associated Diagnoses: Pneumonia of both lungs due to infectious organism, unspecified part of lung Activity: Activity as tolerated  Diet: Regular Diet    Follow-up:   No orders of the defined types were placed in this encounter.           Time spent to discharge patient 35 minutes  Signed:  Sole Tucker MD  12/14/2020  4:45 PM

## 2020-12-14 NOTE — PROGRESS NOTES
Interpreting services available please call [phone] for assistance      [place] [id number] [facility] [address] [facility]  [phone] (phone)

## 2020-12-14 NOTE — PROGRESS NOTES
Hospitalist Progress Note    2020  Admit Date: 2020 11:56 PM   NAME: Ashely Jeffrey   :  1975   MRN:  [id number]   Attending: Jaiden Posada MD  PCP:  Thomas Rice MD    SUBJECTIVE:   51yo M without significant medical hx who presented from urgent care with bilateral pneumonia that failed outpatient treatement. He is visiting from [place] and has a positive covid contact here but his initial test is negative. CT chest with bilateral infiltrates concerning for covid. procal low and trended negative on levaquin. bnp negative. No tobacco or drug use. 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 Off o2 at rest   No insurance for o2 at home, but if can ambulate off oxygen interested in going home.     Review of Systems negative with exception of pertinent positives noted above  PHYSICAL EXAM     Visit Vitals  /88 (BP 1 Location: Left arm, BP Patient Position: At rest)   Pulse 80   Temp 98.5 °F (36.9 °C)   Resp 14   Ht 5' (1.524 m)   Wt 68 kg (150 lb)   SpO2 90%   BMI 29.29 kg/m²      Temp (24hrs), Av.3 °F (36.8 °C), Min:98.1 °F (36.7 °C), Max:98.5 °F (36.9 °C)    Oxygen Therapy  O2 Sat (%): 90 % (20 1138)  Pulse via Oximetry: 86 beats per minute (20 07)  O2 Device: Nasal cannula (20)  O2 Flow Rate (L/min): 2 l/min (20 0729)    Intake/Output Summary (Last 24 hours) at 2020 1602  Last data filed at 2020 0935  Gross per 24 hour   Intake 0 ml   Output 475 ml   Net -475 ml      General: No acute distress    Lungs:  Coarse, rales bases   Heart:  Regular rate and rhythm,  No murmur, rub, or gallop  Abdomen: Soft, Non distended, Non tender, Positive bowel sounds  Extremities: No cyanosis, clubbing or edema  Neurologic:  No focal deficits    US ABD LTD   Final Result   IMPRESSION: Hepatic steatosis. CT CHEST W CONT   Final Result   IMPRESSION:   1. No evidence of pulmonary embolism. 2. Multifocal atypical pneumonia with a pattern commonly seen with Covid 19. XR CHEST PORT   Final Result   IMPRESSION: Bilateral lung infiltrates suspect for acute pneumonia, possibly   Covid 19. ASSESSMENT      Plan:    COVID pna  Initially with false negative, but repeat testing confirms. Stop abx  No insurnace for home O2 but if ambulates off negative for COVID, can be discharged. Here with family who are also covid positive.           Signed By: Audelia Almeida MD     December 14, 2020

## 2020-12-15 ENCOUNTER — HOSPITAL ENCOUNTER (OUTPATIENT)
Dept: GENERAL RADIOLOGY | Age: 45
Discharge: HOME | End: 2020-12-15
Attending: EMERGENCY MEDICINE

## 2020-12-15 DIAGNOSIS — Z71.89 OTHER REASONS FOR SEEKING CONSULTATION: ICD-10-CM

## 2020-12-15 NOTE — DISCHARGE INSTRUCTIONS
DISCHARGE SUMMARY from Nurse    PATIENT INSTRUCTIONS:    After general anesthesia or intravenous sedation, for 24 hours or while taking prescription Narcotics:  · Limit your activities  · Do not drive and operate hazardous machinery  · Do not make important personal or business decisions  · Do  not drink alcoholic beverages  · If you have not urinated within 8 hours after discharge, please contact your surgeon on call. Report the following to your surgeon:  · Excessive pain, swelling, redness or odor of or around the surgical area  · Temperature over 100.5  · Nausea and vomiting lasting longer than 4 hours or if unable to take medications  · Any signs of decreased circulation or nerve impairment to extremity: change in color, persistent  numbness, tingling, coldness or increase pain  · Any questions    What to do at Home:  Recommended activity: Activity as tolerated. If you experience any of the following symptoms listed above, please follow up with your primary care doctor. *  Please give a list of your current medications to your Primary Care Provider. *  Please update this list whenever your medications are discontinued, doses are      changed, or new medications (including over-the-counter products) are added. *  Please carry medication information at all times in case of emergency situations. These are general instructions for a healthy lifestyle:    No smoking/ No tobacco products/ Avoid exposure to second hand smoke  Surgeon General's Warning:  Quitting smoking now greatly reduces serious risk to your health.     Obesity, smoking, and sedentary lifestyle greatly increases your risk for illness    A healthy diet, regular physical exercise & weight monitoring are important for maintaining a healthy lifestyle    You may be retaining fluid if you have a history of heart failure or if you experience any of the following symptoms:  Weight gain of 3 pounds or more overnight or 5 pounds in a week, increased swelling in our hands or feet or shortness of breath while lying flat in bed. Please call your doctor as soon as you notice any of these symptoms; do not wait until your next office visit. The discharge information has been reviewed with the patient. The patient verbalized understanding. Discharge medications reviewed with the patient and appropriate educational materials and side effects teaching were provided.   ___________________________________________________________________________________________________________________________________

## 2020-12-15 NOTE — PROGRESS NOTES
Discharge instructions and Rx's given to pt. Pt verbalized understanding. Will call taxi for pt transport.

## 2020-12-16 LAB
BACTERIA SPEC CULT: NORMAL
BACTERIA SPEC CULT: NORMAL
SERVICE CMNT-IMP: NORMAL
SERVICE CMNT-IMP: NORMAL

## 2022-03-20 PROBLEM — J96.90 RESPIRATORY FAILURE (HCC): Status: ACTIVE | Noted: 2020-12-10

## 2023-12-07 ENCOUNTER — OFFICE VISIT (OUTPATIENT)
Dept: FAMILY MEDICINE CLINIC | Facility: CLINIC | Age: 48
End: 2023-12-07
Payer: COMMERCIAL

## 2023-12-07 VITALS
DIASTOLIC BLOOD PRESSURE: 86 MMHG | HEART RATE: 112 BPM | SYSTOLIC BLOOD PRESSURE: 106 MMHG | OXYGEN SATURATION: 98 % | WEIGHT: 154 LBS | TEMPERATURE: 98.2 F

## 2023-12-07 DIAGNOSIS — E66.3 OVERWEIGHT: ICD-10-CM

## 2023-12-07 DIAGNOSIS — E11.65 UNCONTROLLED TYPE 2 DIABETES MELLITUS WITH HYPERGLYCEMIA (HCC): ICD-10-CM

## 2023-12-07 DIAGNOSIS — E78.5 HYPERLIPIDEMIA, UNSPECIFIED HYPERLIPIDEMIA TYPE: ICD-10-CM

## 2023-12-07 DIAGNOSIS — R80.9 MICROALBUMINURIA DUE TO TYPE 2 DIABETES MELLITUS (HCC): ICD-10-CM

## 2023-12-07 DIAGNOSIS — E55.9 VITAMIN D DEFICIENCY: ICD-10-CM

## 2023-12-07 DIAGNOSIS — E66.3 OVERWEIGHT: Primary | ICD-10-CM

## 2023-12-07 DIAGNOSIS — E11.29 MICROALBUMINURIA DUE TO TYPE 2 DIABETES MELLITUS (HCC): ICD-10-CM

## 2023-12-07 DIAGNOSIS — Z12.11 ENCOUNTER FOR SCREENING COLONOSCOPY: ICD-10-CM

## 2023-12-07 DIAGNOSIS — I10 PRIMARY HYPERTENSION: ICD-10-CM

## 2023-12-07 PROBLEM — E11.9 TYPE 2 DIABETES MELLITUS WITHOUT COMPLICATION, WITHOUT LONG-TERM CURRENT USE OF INSULIN (HCC): Status: ACTIVE | Noted: 2022-08-25

## 2023-12-07 PROBLEM — R74.8 ELEVATED ALKALINE PHOSPHATASE LEVEL: Status: ACTIVE | Noted: 2023-12-07

## 2023-12-07 PROBLEM — Z79.4 CONTROLLED TYPE 2 DIABETES MELLITUS WITH MICROALBUMINURIA, WITH LONG-TERM CURRENT USE OF INSULIN (HCC): Status: ACTIVE | Noted: 2022-08-25

## 2023-12-07 PROBLEM — J96.90 RESPIRATORY FAILURE (HCC): Status: RESOLVED | Noted: 2020-12-10 | Resolved: 2023-12-07

## 2023-12-07 LAB
25(OH)D3 SERPL-MCNC: 18 NG/ML (ref 30–100)
ALBUMIN SERPL-MCNC: 4 G/DL (ref 3.5–5)
ALBUMIN/GLOB SERPL: 1.3 (ref 0.4–1.6)
ALP SERPL-CCNC: 156 U/L (ref 50–136)
ALT SERPL-CCNC: 35 U/L (ref 12–65)
ANION GAP SERPL CALC-SCNC: 9 MMOL/L (ref 2–11)
AST SERPL-CCNC: 18 U/L (ref 15–37)
BASOPHILS # BLD: 0.1 K/UL (ref 0–0.2)
BASOPHILS NFR BLD: 1 % (ref 0–2)
BILIRUB SERPL-MCNC: 0.5 MG/DL (ref 0.2–1.1)
BUN SERPL-MCNC: 22 MG/DL (ref 6–23)
CALCIUM SERPL-MCNC: 9.1 MG/DL (ref 8.3–10.4)
CHLORIDE SERPL-SCNC: 100 MMOL/L (ref 103–113)
CHOLEST SERPL-MCNC: 183 MG/DL
CO2 SERPL-SCNC: 27 MMOL/L (ref 21–32)
CREAT SERPL-MCNC: 1.3 MG/DL (ref 0.8–1.5)
CREAT UR-MCNC: 100 MG/DL
DIFFERENTIAL METHOD BLD: NORMAL
EOSINOPHIL # BLD: 0.1 K/UL (ref 0–0.8)
EOSINOPHIL NFR BLD: 2 % (ref 0.5–7.8)
ERYTHROCYTE [DISTWIDTH] IN BLOOD BY AUTOMATED COUNT: 12.7 % (ref 11.9–14.6)
GLOBULIN SER CALC-MCNC: 3 G/DL (ref 2.8–4.5)
GLUCOSE SERPL-MCNC: 313 MG/DL (ref 65–100)
HBA1C MFR BLD: 12.7 %
HCT VFR BLD AUTO: 47.2 % (ref 41.1–50.3)
HDLC SERPL-MCNC: 39 MG/DL (ref 40–60)
HDLC SERPL: 4.7
HGB BLD-MCNC: 16.3 G/DL (ref 13.6–17.2)
IMM GRANULOCYTES # BLD AUTO: 0 K/UL (ref 0–0.5)
IMM GRANULOCYTES NFR BLD AUTO: 0 % (ref 0–5)
LDLC SERPL CALC-MCNC: 80.4 MG/DL
LYMPHOCYTES # BLD: 2.8 K/UL (ref 0.5–4.6)
LYMPHOCYTES NFR BLD: 34 % (ref 13–44)
MCH RBC QN AUTO: 29.9 PG (ref 26.1–32.9)
MCHC RBC AUTO-ENTMCNC: 34.5 G/DL (ref 31.4–35)
MCV RBC AUTO: 86.4 FL (ref 82–102)
MICROALBUMIN UR-MCNC: 3.9 MG/DL
MICROALBUMIN/CREAT UR-RTO: 39 MG/G (ref 0–30)
MONOCYTES # BLD: 0.5 K/UL (ref 0.1–1.3)
MONOCYTES NFR BLD: 6 % (ref 4–12)
NEUTS SEG # BLD: 4.7 K/UL (ref 1.7–8.2)
NEUTS SEG NFR BLD: 57 % (ref 43–78)
NRBC # BLD: 0 K/UL (ref 0–0.2)
PLATELET # BLD AUTO: 262 K/UL (ref 150–450)
PMV BLD AUTO: 10.5 FL (ref 9.4–12.3)
POTASSIUM SERPL-SCNC: 3.8 MMOL/L (ref 3.5–5.1)
PROT SERPL-MCNC: 7 G/DL (ref 6.3–8.2)
PSA SERPL-MCNC: 0.8 NG/ML
RBC # BLD AUTO: 5.46 M/UL (ref 4.23–5.6)
SODIUM SERPL-SCNC: 136 MMOL/L (ref 136–146)
TRIGL SERPL-MCNC: 318 MG/DL (ref 35–150)
TSH W FREE THYROID IF ABNORMAL: 1.6 UIU/ML (ref 0.36–3.74)
VLDLC SERPL CALC-MCNC: 63.6 MG/DL (ref 6–23)
WBC # BLD AUTO: 8.3 K/UL (ref 4.3–11.1)

## 2023-12-07 PROCEDURE — 3074F SYST BP LT 130 MM HG: CPT | Performed by: NURSE PRACTITIONER

## 2023-12-07 PROCEDURE — 3079F DIAST BP 80-89 MM HG: CPT | Performed by: NURSE PRACTITIONER

## 2023-12-07 PROCEDURE — 99204 OFFICE O/P NEW MOD 45 MIN: CPT | Performed by: NURSE PRACTITIONER

## 2023-12-07 PROCEDURE — 83036 HEMOGLOBIN GLYCOSYLATED A1C: CPT | Performed by: NURSE PRACTITIONER

## 2023-12-07 RX ORDER — SIMVASTATIN 20 MG
20 TABLET ORAL NIGHTLY
Qty: 30 TABLET | Refills: 2 | Status: SHIPPED | OUTPATIENT
Start: 2023-12-07

## 2023-12-07 RX ORDER — ERGOCALCIFEROL 1.25 MG/1
50000 CAPSULE ORAL WEEKLY
Qty: 12 CAPSULE | Refills: 0 | Status: SHIPPED | OUTPATIENT
Start: 2023-12-07

## 2023-12-07 RX ORDER — LISINOPRIL 2.5 MG/1
2.5 TABLET ORAL DAILY
Qty: 90 TABLET | Refills: 1 | Status: SHIPPED | OUTPATIENT
Start: 2023-12-07

## 2023-12-07 RX ORDER — INSULIN DEGLUDEC 200 U/ML
INJECTION, SOLUTION SUBCUTANEOUS
COMMUNITY
End: 2023-12-07 | Stop reason: SDUPTHER

## 2023-12-07 SDOH — ECONOMIC STABILITY: FOOD INSECURITY: WITHIN THE PAST 12 MONTHS, YOU WORRIED THAT YOUR FOOD WOULD RUN OUT BEFORE YOU GOT MONEY TO BUY MORE.: SOMETIMES TRUE

## 2023-12-07 SDOH — ECONOMIC STABILITY: FOOD INSECURITY: WITHIN THE PAST 12 MONTHS, THE FOOD YOU BOUGHT JUST DIDN'T LAST AND YOU DIDN'T HAVE MONEY TO GET MORE.: SOMETIMES TRUE

## 2023-12-07 SDOH — ECONOMIC STABILITY: INCOME INSECURITY: HOW HARD IS IT FOR YOU TO PAY FOR THE VERY BASICS LIKE FOOD, HOUSING, MEDICAL CARE, AND HEATING?: SOMEWHAT HARD

## 2023-12-07 SDOH — ECONOMIC STABILITY: HOUSING INSECURITY
IN THE LAST 12 MONTHS, WAS THERE A TIME WHEN YOU DID NOT HAVE A STEADY PLACE TO SLEEP OR SLEPT IN A SHELTER (INCLUDING NOW)?: NO

## 2023-12-07 ASSESSMENT — PATIENT HEALTH QUESTIONNAIRE - PHQ9
2. FEELING DOWN, DEPRESSED OR HOPELESS: 0
SUM OF ALL RESPONSES TO PHQ QUESTIONS 1-9: 0
1. LITTLE INTEREST OR PLEASURE IN DOING THINGS: 0
SUM OF ALL RESPONSES TO PHQ9 QUESTIONS 1 & 2: 0
SUM OF ALL RESPONSES TO PHQ QUESTIONS 1-9: 0

## 2023-12-07 ASSESSMENT — ENCOUNTER SYMPTOMS
RESPIRATORY NEGATIVE: 1
GASTROINTESTINAL NEGATIVE: 1
ALLERGIC/IMMUNOLOGIC NEGATIVE: 1
EYES NEGATIVE: 1

## 2023-12-07 NOTE — PROGRESS NOTES
Bret Allan is a 46 y.o. male who presents today for the following:  Chief Complaint   Patient presents with    New Patient     Pt presents today as a new pt establishing care. Pt needs refills       No Known Allergies    Current Outpatient Medications   Medication Sig Dispense Refill    empagliflozin (JARDIANCE) 10 MG tablet Take 1 tablet by mouth daily 30 tablet 2    metFORMIN (GLUCOPHAGE) 1000 MG tablet Take 1 tablet by mouth 2 times daily (with meals) 60 tablet 2    Insulin Degludec 100 UNIT/ML SOPN Inject 25 Units into the skin at bedtime 3 Adjustable Dose Pre-filled Pen Syringe 5    Insulin Pen Needle 31G X 5 MM MISC 1 each by Does not apply route daily 100 each 5    simvastatin (ZOCOR) 20 MG tablet Take 1 tablet by mouth nightly 30 tablet 2    lisinopril (PRINIVIL;ZESTRIL) 2.5 MG tablet Take 1 tablet by mouth daily 90 tablet 1    vitamin D (ERGOCALCIFEROL) 1.25 MG (13567 UT) CAPS capsule Take 1 capsule by mouth once a week 12 capsule 0    albuterol sulfate  (90 Base) MCG/ACT inhaler Inhale 2 puffs into the lungs every 6 hours as needed (Patient not taking: Reported on 2023)      dexamethasone (DECADRON) 6 MG tablet Take 6 mg by mouth every morning (before breakfast) (Patient not taking: Reported on 2023)       No current facility-administered medications for this visit. Past Medical History:   Diagnosis Date    Diabetes type 2, uncontrolled     Hyperlipidemia     Hypertension     Overweight     Respiratory failure ([address]) 12/10/2020    Vitamin D deficiency        No past surgical history on file. Social History     Tobacco Use    Smoking status: Some Days     Packs/day: .25     Types: Cigarettes    Smokeless tobacco: Current    Tobacco comments:     Quit smokin-8 cigarrets   Substance Use Topics    Alcohol use:  Yes     Alcohol/week: 8.0 standard drinks of alcohol        Family History   Problem Relation Age of Onset    Diabetes Father     Diabetes Mother

## 2023-12-08 NOTE — RESULT ENCOUNTER NOTE
Urine microalbumin up, so I restarted his lisinopril as discussed.  Vit d low so I ordered weekly supplement to take for 12 weeks then on week 13 may take OTC daily supplement 1000 units daily and encourage calcium rich foods and or supplement 1200 mg daily through food and/or supplement.  Gluc significantly elevated 313 and elevation in one of his liver enzymes that indicates possible colon, bone, or liver source.  We will monitor this.      Please let me know if patient has any further questions or concerns.

## 2023-12-14 ENCOUNTER — TELEPHONE (OUTPATIENT)
Dept: GASTROENTEROLOGY | Age: 48
End: 2023-12-14

## 2024-03-06 ENCOUNTER — HOSPITAL ENCOUNTER (OUTPATIENT)
Dept: GENERAL RADIOLOGY | Age: 49
Discharge: HOME | End: 2024-03-09
Payer: COMMERCIAL

## 2024-03-06 ENCOUNTER — OFFICE VISIT (OUTPATIENT)
Dept: PRIMARY CARE CLINIC | Facility: CLINIC | Age: 49
End: 2024-03-06
Payer: COMMERCIAL

## 2024-03-06 VITALS
BODY MASS INDEX: 27.46 KG/M2 | SYSTOLIC BLOOD PRESSURE: 127 MMHG | TEMPERATURE: 98.2 F | OXYGEN SATURATION: 97 % | WEIGHT: 155 LBS | HEIGHT: 63 IN | RESPIRATION RATE: 18 BRPM | DIASTOLIC BLOOD PRESSURE: 80 MMHG | HEART RATE: 89 BPM

## 2024-03-06 DIAGNOSIS — Z11.1 TUBERCULOSIS SCREENING: Primary | ICD-10-CM

## 2024-03-06 DIAGNOSIS — Z11.1 TUBERCULOSIS SCREENING: ICD-10-CM

## 2024-03-06 PROCEDURE — 3074F SYST BP LT 130 MM HG: CPT | Performed by: PHYSICIAN ASSISTANT

## 2024-03-06 PROCEDURE — 71046 X-RAY EXAM CHEST 2 VIEWS: CPT

## 2024-03-06 PROCEDURE — 3079F DIAST BP 80-89 MM HG: CPT | Performed by: PHYSICIAN ASSISTANT

## 2024-03-06 PROCEDURE — 99202 OFFICE O/P NEW SF 15 MIN: CPT | Performed by: PHYSICIAN ASSISTANT

## 2024-03-06 ASSESSMENT — ENCOUNTER SYMPTOMS
COUGH: 0
SHORTNESS OF BREATH: 0
STRIDOR: 0
CHEST TIGHTNESS: 0
APNEA: 0
WHEEZING: 0
CHOKING: 0

## 2024-03-06 ASSESSMENT — PATIENT HEALTH QUESTIONNAIRE - PHQ9
1. LITTLE INTEREST OR PLEASURE IN DOING THINGS: 0
SUM OF ALL RESPONSES TO PHQ9 QUESTIONS 1 & 2: 0
SUM OF ALL RESPONSES TO PHQ QUESTIONS 1-9: 0
2. FEELING DOWN, DEPRESSED OR HOPELESS: 0
SUM OF ALL RESPONSES TO PHQ QUESTIONS 1-9: 0

## 2024-03-06 NOTE — PROGRESS NOTES
once a week 12 capsule 0    albuterol sulfate  (90 Base) MCG/ACT inhaler Inhale 2 puffs into the lungs every 6 hours as needed (Patient not taking: Reported on 3/6/2024)      dexamethasone (DECADRON) 6 MG tablet Take 1 tablet by mouth every morning (before breakfast) (Patient not taking: Reported on 3/6/2024)       No current facility-administered medications for this visit.       Past Medical History:   Diagnosis Date    Diabetes type 2, uncontrolled     Hyperlipidemia     Hypertension     Overweight     Respiratory failure (HCC) 12/10/2020    Vitamin D deficiency      History reviewed. No pertinent surgical history.  Social History     Tobacco Use    Smoking status: Some Days     Current packs/day: 0.25     Average packs/day: 0.3 packs/day for 40.2 years (10.1 ttl pk-yrs)     Types: Cigarettes     Start date: 1983    Smokeless tobacco: Current    Tobacco comments:     Quit smokin-8 cigarrets   Substance Use Topics    Alcohol use: Yes     Alcohol/week: 8.0 standard drinks of alcohol    Drug use: No      Family History   Problem Relation Age of Onset    Diabetes Father     Diabetes Mother        Review of Systems   Constitutional:  Negative for activity change, appetite change, chills, diaphoresis, fatigue, fever and unexpected weight change.   Respiratory:  Negative for apnea, cough, choking, chest tightness, shortness of breath, wheezing and stridor.    Cardiovascular:  Negative for chest pain, palpitations and leg swelling.              Objective   Vitals:    24 1018   BP: 127/80   Pulse: 89   Resp: 18   Temp: 98.2 °F (36.8 °C)   SpO2: 97%       Physical Exam  Constitutional:       Appearance: Normal appearance.   Cardiovascular:      Rate and Rhythm: Normal rate and regular rhythm.      Pulses: Normal pulses.      Heart sounds: Normal heart sounds. No murmur heard.  Pulmonary:      Effort: Pulmonary effort is normal. No respiratory distress.      Breath sounds: Normal breath sounds. No

## 2024-03-20 PROBLEM — K76.0 FATTY LIVER: Status: ACTIVE | Noted: 2024-03-20

## 2024-04-02 DIAGNOSIS — E55.9 VITAMIN D DEFICIENCY: ICD-10-CM

## 2024-04-02 RX ORDER — ERGOCALCIFEROL 1.25 MG/1
50000 CAPSULE ORAL WEEKLY
Qty: 12 CAPSULE | Refills: 0 | OUTPATIENT
Start: 2024-04-02

## 2024-08-09 ENCOUNTER — COMMUNITY OUTREACH (OUTPATIENT)
Dept: FAMILY MEDICINE CLINIC | Facility: CLINIC | Age: 49
End: 2024-08-09

## 2024-10-23 ENCOUNTER — OFFICE VISIT (OUTPATIENT)
Dept: PRIMARY CARE CLINIC | Facility: CLINIC | Age: 49
End: 2024-10-23

## 2024-10-23 VITALS
OXYGEN SATURATION: 98 % | HEART RATE: 93 BPM | SYSTOLIC BLOOD PRESSURE: 115 MMHG | TEMPERATURE: 98.1 F | RESPIRATION RATE: 18 BRPM | BODY MASS INDEX: 28 KG/M2 | DIASTOLIC BLOOD PRESSURE: 78 MMHG | HEIGHT: 63 IN | WEIGHT: 158 LBS

## 2024-10-23 DIAGNOSIS — E11.9 ENCOUNTER FOR DIABETES TYPE 2 EYE EXAM (HCC): ICD-10-CM

## 2024-10-23 DIAGNOSIS — Z20.2 EXPOSURE TO SEXUALLY TRANSMITTED DISEASE (STD): ICD-10-CM

## 2024-10-23 DIAGNOSIS — E78.5 HYPERLIPIDEMIA, UNSPECIFIED HYPERLIPIDEMIA TYPE: ICD-10-CM

## 2024-10-23 DIAGNOSIS — E11.29 MICROALBUMINURIA DUE TO TYPE 2 DIABETES MELLITUS (HCC): ICD-10-CM

## 2024-10-23 DIAGNOSIS — Z11.4 ENCOUNTER FOR SCREENING FOR HIV: ICD-10-CM

## 2024-10-23 DIAGNOSIS — E11.65 UNCONTROLLED TYPE 2 DIABETES MELLITUS WITH HYPERGLYCEMIA (HCC): ICD-10-CM

## 2024-10-23 DIAGNOSIS — Z01.00 ENCOUNTER FOR DIABETES TYPE 2 EYE EXAM (HCC): ICD-10-CM

## 2024-10-23 DIAGNOSIS — E11.65 UNCONTROLLED TYPE 2 DIABETES MELLITUS WITH HYPERGLYCEMIA (HCC): Primary | ICD-10-CM

## 2024-10-23 DIAGNOSIS — E11.9 ENCOUNTER FOR DIABETIC FOOT EXAM (HCC): ICD-10-CM

## 2024-10-23 DIAGNOSIS — Z23 INFLUENZA VACCINE NEEDED: ICD-10-CM

## 2024-10-23 DIAGNOSIS — Z12.11 ENCOUNTER FOR SCREENING COLONOSCOPY: ICD-10-CM

## 2024-10-23 DIAGNOSIS — R80.9 MICROALBUMINURIA DUE TO TYPE 2 DIABETES MELLITUS (HCC): ICD-10-CM

## 2024-10-23 LAB
ALBUMIN SERPL-MCNC: 3.6 G/DL (ref 3.5–5)
ALBUMIN/GLOB SERPL: 1.3 (ref 1–1.9)
ALP SERPL-CCNC: 156 U/L (ref 40–129)
ALT SERPL-CCNC: 21 U/L (ref 8–55)
ANION GAP SERPL CALC-SCNC: 11 MMOL/L (ref 9–18)
AST SERPL-CCNC: 19 U/L (ref 15–37)
BASOPHILS # BLD: 0 K/UL (ref 0–0.2)
BASOPHILS NFR BLD: 1 % (ref 0–2)
BILIRUB SERPL-MCNC: 0.3 MG/DL (ref 0–1.2)
BUN SERPL-MCNC: 14 MG/DL (ref 6–23)
CALCIUM SERPL-MCNC: 9.2 MG/DL (ref 8.8–10.2)
CHLORIDE SERPL-SCNC: 99 MMOL/L (ref 98–107)
CO2 SERPL-SCNC: 25 MMOL/L (ref 20–28)
CREAT SERPL-MCNC: 1.21 MG/DL (ref 0.8–1.3)
DIFFERENTIAL METHOD BLD: NORMAL
EOSINOPHIL # BLD: 0.1 K/UL (ref 0–0.8)
EOSINOPHIL NFR BLD: 1 % (ref 0.5–7.8)
ERYTHROCYTE [DISTWIDTH] IN BLOOD BY AUTOMATED COUNT: 12.8 % (ref 11.9–14.6)
GLOBULIN SER CALC-MCNC: 2.7 G/DL (ref 2.3–3.5)
GLUCOSE SERPL-MCNC: 295 MG/DL (ref 70–99)
GLUCOSE, POC: 162 MG/DL
HBA1C MFR BLD: 10.7 %
HCT VFR BLD AUTO: 43.6 % (ref 41.1–50.3)
HGB BLD-MCNC: 14.9 G/DL (ref 13.6–17.2)
HIV 1+2 AB+HIV1 P24 AG SERPL QL IA: NONREACTIVE
HIV 1/2 RESULT COMMENT: NORMAL
IMM GRANULOCYTES # BLD AUTO: 0 K/UL (ref 0–0.5)
IMM GRANULOCYTES NFR BLD AUTO: 0 % (ref 0–5)
LYMPHOCYTES # BLD: 2 K/UL (ref 0.5–4.6)
LYMPHOCYTES NFR BLD: 35 % (ref 13–44)
MCH RBC QN AUTO: 30.1 PG (ref 26.1–32.9)
MCHC RBC AUTO-ENTMCNC: 34.2 G/DL (ref 31.4–35)
MCV RBC AUTO: 88.1 FL (ref 82–102)
MONOCYTES # BLD: 0.5 K/UL (ref 0.1–1.3)
MONOCYTES NFR BLD: 8 % (ref 4–12)
NEUTS SEG # BLD: 3.2 K/UL (ref 1.7–8.2)
NEUTS SEG NFR BLD: 55 % (ref 43–78)
NRBC # BLD: 0 K/UL (ref 0–0.2)
PLATELET # BLD AUTO: 227 K/UL (ref 150–450)
PMV BLD AUTO: 11.2 FL (ref 9.4–12.3)
POTASSIUM SERPL-SCNC: 4.4 MMOL/L (ref 3.5–5.1)
PROT SERPL-MCNC: 6.3 G/DL (ref 6.3–8.2)
RBC # BLD AUTO: 4.95 M/UL (ref 4.23–5.6)
SODIUM SERPL-SCNC: 135 MMOL/L (ref 136–145)
WBC # BLD AUTO: 5.8 K/UL (ref 4.3–11.1)

## 2024-10-23 RX ORDER — INSULIN GLARGINE 300 U/ML
3 INJECTION, SOLUTION SUBCUTANEOUS NIGHTLY
Qty: 5 ADJUSTABLE DOSE PRE-FILLED PEN SYRINGE | Refills: 0 | Status: SHIPPED | OUTPATIENT
Start: 2024-10-23

## 2024-10-23 RX ORDER — INSULIN DEGLUDEC 200 U/ML
42 INJECTION, SOLUTION SUBCUTANEOUS DAILY
COMMUNITY
End: 2024-10-23

## 2024-10-23 RX ORDER — SIMVASTATIN 20 MG
20 TABLET ORAL NIGHTLY
Qty: 30 TABLET | Refills: 5 | Status: SHIPPED | OUTPATIENT
Start: 2024-10-23

## 2024-10-23 ASSESSMENT — ENCOUNTER SYMPTOMS
SHORTNESS OF BREATH: 0
VISUAL CHANGE: 0

## 2024-10-23 NOTE — PROGRESS NOTES
Malcolm Barrios (:  1975) is a 48 y.o. male,Established patient, here for evaluation of the following chief complaint(s):  Diabetes (Patient presents for an appointment to establish care and manage his diabetes, pt states currently taking Tresiba but would like to be switched to Tojeo, he felt his sugar/glucose more under control, he used to take it but due to not having insurance he was switched to Tresiba./Pt states he's been checking his glucose at home and has been from 123-240) and Medication Refill (Needs refills, atorvastin, metformin and Tojeou if switched)        SUBJECTIVE/OBJECTIVE:    HPI:    The 48 y.o male patient seen to establish care. who presents for on multiple chronic medical conditions. Patient is not very good compliance with medications. Patient express new concerns at this visit. Medications refill, checking his A1c, blood work, and STD exposed.      Referral done.  Blood work ordered.  Diabetic foot exam done  Education on lifestyle done.  Important of taking medication done.        Reviewed today's A1c 10.7 with patient. Last year 2023 A1c was 12.7. Patient is aware of poor compliance with medications. We also discussed most of diabetic complications microvascular and macrovascular, damage to kidneys, eyes, cardiovascular, and neuropathy.      Foot  Diabetic foot exam:  Normal pulses. Normal sensation. No rashes.  Web spaces dry without maceration.  No calluses. No pre-ulcer or ulcerated conditions. Normal distribution of hair on feet.   Monofilament is normal on both feet. I had instructed patient keeping the feet clean help prevent cracks and infection. Also keeping toenails trimmed. Watching out for bunions and cons.      I have reviewed the patient's past medical history, social history and family history and vitals.  We have discussed treatment plan and follow up and given patient instructions.  Patient's questions are answered, and we will follow up as

## 2024-10-24 NOTE — RESULT ENCOUNTER NOTE
Please let patient know that Glucose 295, yesterday A1c 10.7. He needs to take his medications as directed and indicated, lifestyle modification with diabetic diet, and regular walking as tolerated.

## 2024-10-25 LAB
C TRACH RRNA SPEC QL NAA+PROBE: NEGATIVE
N GONORRHOEA RRNA SPEC QL NAA+PROBE: NEGATIVE
SPECIMEN SOURCE: NORMAL
T VAGINALIS RRNA SPEC QL NAA+PROBE: NEGATIVE

## 2024-10-29 NOTE — RESULT ENCOUNTER NOTE
Please let Mr. Fowler be aware his STD lab resulted negative. No treatment needed. If he has any symptom needs to call the office. PAST MEDICAL HISTORY:  No pertinent past medical history

## 2024-11-06 ENCOUNTER — OFFICE VISIT (OUTPATIENT)
Dept: PRIMARY CARE CLINIC | Facility: CLINIC | Age: 49
End: 2024-11-06
Payer: COMMERCIAL

## 2024-11-06 VITALS
HEART RATE: 90 BPM | TEMPERATURE: 98.9 F | BODY MASS INDEX: 27.64 KG/M2 | RESPIRATION RATE: 18 BRPM | WEIGHT: 156 LBS | SYSTOLIC BLOOD PRESSURE: 109 MMHG | OXYGEN SATURATION: 97 % | HEIGHT: 63 IN | DIASTOLIC BLOOD PRESSURE: 77 MMHG

## 2024-11-06 DIAGNOSIS — E11.65 UNCONTROLLED TYPE 2 DIABETES MELLITUS WITH HYPERGLYCEMIA (HCC): Primary | ICD-10-CM

## 2024-11-06 LAB — GLUCOSE, POC: 201 MG/DL

## 2024-11-06 PROCEDURE — 3074F SYST BP LT 130 MM HG: CPT | Performed by: PHYSICIAN ASSISTANT

## 2024-11-06 PROCEDURE — 82962 GLUCOSE BLOOD TEST: CPT | Performed by: PHYSICIAN ASSISTANT

## 2024-11-06 PROCEDURE — 3078F DIAST BP <80 MM HG: CPT | Performed by: PHYSICIAN ASSISTANT

## 2024-11-06 PROCEDURE — 99213 OFFICE O/P EST LOW 20 MIN: CPT | Performed by: PHYSICIAN ASSISTANT

## 2024-11-06 RX ORDER — INSULIN GLARGINE 300 U/ML
42 INJECTION, SOLUTION SUBCUTANEOUS NIGHTLY
Qty: 5 ADJUSTABLE DOSE PRE-FILLED PEN SYRINGE | Refills: 1 | Status: SHIPPED | OUTPATIENT
Start: 2024-11-06

## 2024-11-06 NOTE — PROGRESS NOTES
Malcolm Barrios (:  1975) is a 48 y.o. male here for evaluation of the following chief complaint(s):  Follow-up and Diabetes (Patient presents for follow up on Diabetes, patient states he's been checking his glucose at home and it has been from 112-140, states he's been taking his medication as directed.)      Assessment & Plan   ASSESSMENT/PLAN:  1. Uncontrolled type 2 diabetes mellitus with hyperglycemia (HCC)  Comments:  Changed toujeo dose to reflex 42 units qhs which he has been using. continue current regimen, recheck a1c 11 weeks  Orders:  -     AMB POC GLUCOSE BLOOD, BY GLUCOSE MONITORING DEVICE  -     empagliflozin (JARDIANCE) 10 MG tablet; Take 1 tablet by mouth daily, Disp-30 tablet, R-2Normal  -     Insulin Glargine, 2 Unit Dial, (TOUJEO MAX SOLOSTAR) 300 UNIT/ML concentrated injection pen; Inject 42 Units into the skin at bedtime, Disp-5 Adjustable Dose Pre-filled Pen Syringe, R-1Normal      Results for orders placed or performed in visit on 24   AMB POC GLUCOSE BLOOD, BY GLUCOSE MONITORING DEVICE   Result Value Ref Range    Glucose,  MG/DL        Return in about 11 weeks (around 2025) for A1C, Follow up.         Subjective   SUBJECTIVE/OBJECTIVE:  Pt here to f/u - he was seen about 2 weeks ago and told to return today with BS log. He did not bring a log but says he has been checking at home and readings are ranging from 110-150. He says he was confused about his insulin dose- he was switched to toujeo but Rx only said to dahlia 3 units qhs, previously he was using 52 units of his other insulin at night. He says he has been using 42 units at night and that has been working well for him. Fasting sugars are in low 100s but he has not had any lows. He Has diabetic eye exam scheduled for January. He feels well, has no concerns to address today .         No Known Allergies  Current Outpatient Medications   Medication Sig Dispense Refill    empagliflozin (JARDIANCE) 10 MG

## 2024-11-11 ENCOUNTER — TELEPHONE (OUTPATIENT)
Dept: PRIMARY CARE CLINIC | Facility: CLINIC | Age: 49
End: 2024-11-11

## 2024-11-11 NOTE — TELEPHONE ENCOUNTER
Patient called multiple times and has been unavailable referral coordinator has been trying to reach him to schedule a colonoscopy.

## 2025-01-23 ENCOUNTER — OFFICE VISIT (OUTPATIENT)
Dept: PRIMARY CARE CLINIC | Facility: CLINIC | Age: 50
End: 2025-01-23
Payer: COMMERCIAL

## 2025-01-23 VITALS
HEIGHT: 63 IN | OXYGEN SATURATION: 95 % | SYSTOLIC BLOOD PRESSURE: 101 MMHG | BODY MASS INDEX: 27.64 KG/M2 | DIASTOLIC BLOOD PRESSURE: 76 MMHG | RESPIRATION RATE: 18 BRPM | TEMPERATURE: 98.5 F | WEIGHT: 156 LBS | HEART RATE: 93 BPM

## 2025-01-23 DIAGNOSIS — Z86.39 HISTORY OF VITAMIN D DEFICIENCY: ICD-10-CM

## 2025-01-23 DIAGNOSIS — E78.5 HYPERLIPIDEMIA, UNSPECIFIED HYPERLIPIDEMIA TYPE: ICD-10-CM

## 2025-01-23 DIAGNOSIS — E11.65 UNCONTROLLED TYPE 2 DIABETES MELLITUS WITH HYPERGLYCEMIA (HCC): Primary | ICD-10-CM

## 2025-01-23 DIAGNOSIS — E11.29 MICROALBUMINURIA DUE TO TYPE 2 DIABETES MELLITUS (HCC): ICD-10-CM

## 2025-01-23 DIAGNOSIS — R80.9 MICROALBUMINURIA DUE TO TYPE 2 DIABETES MELLITUS (HCC): ICD-10-CM

## 2025-01-23 PROBLEM — E11.9 DIABETES (HCC): Status: RESOLVED | Noted: 2024-10-23 | Resolved: 2025-01-23

## 2025-01-23 LAB
ALB/CREAT RATIO, POC: ABNORMAL MG/G
ALBUMIN, URINE, POC: 80 MG/L
CREATININE, URINE, POC: 100 MG/DL
HBA1C MFR BLD: 10.3 %

## 2025-01-23 PROCEDURE — 3074F SYST BP LT 130 MM HG: CPT | Performed by: NURSE PRACTITIONER

## 2025-01-23 PROCEDURE — 82044 UR ALBUMIN SEMIQUANTITATIVE: CPT | Performed by: NURSE PRACTITIONER

## 2025-01-23 PROCEDURE — 83036 HEMOGLOBIN GLYCOSYLATED A1C: CPT | Performed by: NURSE PRACTITIONER

## 2025-01-23 PROCEDURE — 3078F DIAST BP <80 MM HG: CPT | Performed by: NURSE PRACTITIONER

## 2025-01-23 PROCEDURE — 99214 OFFICE O/P EST MOD 30 MIN: CPT | Performed by: NURSE PRACTITIONER

## 2025-01-23 RX ORDER — SIMVASTATIN 20 MG
20 TABLET ORAL NIGHTLY
Qty: 90 TABLET | Refills: 3 | Status: SHIPPED | OUTPATIENT
Start: 2025-01-23

## 2025-01-23 RX ORDER — INSULIN GLARGINE 300 U/ML
42 INJECTION, SOLUTION SUBCUTANEOUS NIGHTLY
Qty: 5 ADJUSTABLE DOSE PRE-FILLED PEN SYRINGE | Refills: 3 | Status: SHIPPED | OUTPATIENT
Start: 2025-01-23

## 2025-01-23 RX ORDER — INSULIN GLARGINE 300 U/ML
42 INJECTION, SOLUTION SUBCUTANEOUS NIGHTLY
Qty: 5 ADJUSTABLE DOSE PRE-FILLED PEN SYRINGE | Refills: 1 | Status: SHIPPED | OUTPATIENT
Start: 2025-01-23 | End: 2025-01-23

## 2025-01-23 ASSESSMENT — PATIENT HEALTH QUESTIONNAIRE - PHQ9
SUM OF ALL RESPONSES TO PHQ QUESTIONS 1-9: 0
SUM OF ALL RESPONSES TO PHQ9 QUESTIONS 1 & 2: 0
SUM OF ALL RESPONSES TO PHQ QUESTIONS 1-9: 0
SUM OF ALL RESPONSES TO PHQ QUESTIONS 1-9: 0
2. FEELING DOWN, DEPRESSED OR HOPELESS: NOT AT ALL
1. LITTLE INTEREST OR PLEASURE IN DOING THINGS: NOT AT ALL
SUM OF ALL RESPONSES TO PHQ QUESTIONS 1-9: 0

## 2025-01-23 ASSESSMENT — ENCOUNTER SYMPTOMS
CONSTIPATION: 0
DIARRHEA: 0
VISUAL CHANGE: 0
NAUSEA: 0
ABDOMINAL PAIN: 0
COUGH: 0
BLURRED VISION: 0
BLOOD IN STOOL: 0
SHORTNESS OF BREATH: 0
VOMITING: 0

## 2025-01-23 NOTE — PROGRESS NOTES
Malcolm Barrios (:  1975) is a 49 y.o. male here for evaluation of the following chief complaint(s):  Chief Complaint   Patient presents with    Follow-up    Diabetes     Patient present for a follow up on diabetes, pt has been checking his glucose at home and today was 139, pt states that his glucose ranges . States he's been out of the insulin for a month, pharmacy didn't give him a refill      Reviewed and updated this visit by provider:  Tobacco  Allergies  Meds  Problems  Med Hx  Surg Hx  Fam Hx         BG at home 120s, 170, 90s. Ran out of insulin one month ago. Fasting glucose this morning was 129. Highest reading received was 203. Has gone back to the gym once a week to once a month. Current job is active getting 18,000 steps/day. 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 Can tell if BG is high, starts having blurry vision. Doing foot check nightly. Was scheduled for DM eye exam yesterday but appt canceled due to weather. Pt planning to reschedule.    Diabetes  He presents for his follow-up diabetic visit. He has type 2 diabetes mellitus. Pertinent negatives for hypoglycemia include no dizziness or headaches. Pertinent negatives for diabetes include no blurred vision, no chest pain, no fatigue, no foot ulcerations, no polydipsia, no polyphagia, no visual change and no weight loss.        Immunizations:  Immunization status: up to date and documented.    Review of Systems:   Review of Systems   Constitutional:  Negative for fatigue and weight loss.   Eyes:  Negative for blurred vision.   Respiratory:  Negative for cough and shortness of breath.    Cardiovascular:  Negative for chest pain, palpitations and leg swelling.   Gastrointestinal:  Negative for abdominal pain, blood in stool, constipation, diarrhea, nausea and vomiting.   Endocrine: Negative for polydipsia and polyphagia.   Neurological:  Negative for dizziness, syncope and headaches.        BP